# Patient Record
Sex: MALE | Race: WHITE | NOT HISPANIC OR LATINO | Employment: UNEMPLOYED | URBAN - METROPOLITAN AREA
[De-identification: names, ages, dates, MRNs, and addresses within clinical notes are randomized per-mention and may not be internally consistent; named-entity substitution may affect disease eponyms.]

---

## 2021-12-10 ENCOUNTER — HOSPITAL ENCOUNTER (INPATIENT)
Facility: CLINIC | Age: 36
LOS: 4 days | Discharge: HOME | End: 2021-12-14
Attending: EMERGENCY MEDICINE | Admitting: INTERNAL MEDICINE
Payer: COMMERCIAL

## 2021-12-10 DIAGNOSIS — F10.931 ALCOHOL WITHDRAWAL, WITH DELIRIUM (H): ICD-10-CM

## 2021-12-10 DIAGNOSIS — J18.9 PNEUMONIA DUE TO INFECTIOUS ORGANISM, UNSPECIFIED LATERALITY, UNSPECIFIED PART OF LUNG: Primary | ICD-10-CM

## 2021-12-10 DIAGNOSIS — R44.3 HALLUCINATIONS: ICD-10-CM

## 2021-12-10 LAB
ALBUMIN SERPL-MCNC: 3.7 G/DL (ref 3.4–5)
ALP SERPL-CCNC: 59 U/L (ref 40–150)
ALT SERPL W P-5'-P-CCNC: 60 U/L (ref 0–70)
ANION GAP SERPL CALCULATED.3IONS-SCNC: 8 MMOL/L (ref 3–14)
AST SERPL W P-5'-P-CCNC: 42 U/L (ref 0–45)
BASOPHILS # BLD AUTO: 0 10E3/UL (ref 0–0.2)
BASOPHILS NFR BLD AUTO: 0 %
BILIRUB SERPL-MCNC: 1.8 MG/DL (ref 0.2–1.3)
BUN SERPL-MCNC: 6 MG/DL (ref 7–30)
CALCIUM SERPL-MCNC: 9.1 MG/DL (ref 8.5–10.1)
CHLORIDE BLD-SCNC: 102 MMOL/L (ref 94–109)
CO2 SERPL-SCNC: 24 MMOL/L (ref 20–32)
CREAT SERPL-MCNC: 0.76 MG/DL (ref 0.66–1.25)
CREAT SERPL-MCNC: 0.76 MG/DL (ref 0.66–1.25)
EOSINOPHIL # BLD AUTO: 0.1 10E3/UL (ref 0–0.7)
EOSINOPHIL NFR BLD AUTO: 2 %
ERYTHROCYTE [DISTWIDTH] IN BLOOD BY AUTOMATED COUNT: 11.5 % (ref 10–15)
GFR SERPL CREATININE-BSD FRML MDRD: >90 ML/MIN/1.73M2
GFR SERPL CREATININE-BSD FRML MDRD: >90 ML/MIN/1.73M2
GLUCOSE BLD-MCNC: 135 MG/DL (ref 70–99)
GLUCOSE BLDC GLUCOMTR-MCNC: 101 MG/DL (ref 70–99)
GLUCOSE BLDC GLUCOMTR-MCNC: 133 MG/DL (ref 70–99)
HCT VFR BLD AUTO: 40.9 % (ref 40–53)
HGB BLD-MCNC: 14 G/DL (ref 13.3–17.7)
HOLD SPECIMEN: NORMAL
HOLD SPECIMEN: NORMAL
IMM GRANULOCYTES # BLD: 0 10E3/UL
IMM GRANULOCYTES NFR BLD: 0 %
LYMPHOCYTES # BLD AUTO: 0.5 10E3/UL (ref 0.8–5.3)
LYMPHOCYTES NFR BLD AUTO: 13 %
MAGNESIUM SERPL-MCNC: 2.2 MG/DL (ref 1.6–2.3)
MAGNESIUM SERPL-MCNC: 2.2 MG/DL (ref 1.6–2.3)
MCH RBC QN AUTO: 33 PG (ref 26.5–33)
MCHC RBC AUTO-ENTMCNC: 34.2 G/DL (ref 31.5–36.5)
MCV RBC AUTO: 97 FL (ref 78–100)
MONOCYTES # BLD AUTO: 0.7 10E3/UL (ref 0–1.3)
MONOCYTES NFR BLD AUTO: 16 %
NEUTROPHILS # BLD AUTO: 2.9 10E3/UL (ref 1.6–8.3)
NEUTROPHILS NFR BLD AUTO: 69 %
NRBC # BLD AUTO: 0 10E3/UL
NRBC BLD AUTO-RTO: 0 /100
PHOSPHATE SERPL-MCNC: 3.5 MG/DL (ref 2.5–4.5)
PHOSPHATE SERPL-MCNC: 4.9 MG/DL (ref 2.5–4.5)
PLATELET # BLD AUTO: 84 10E3/UL (ref 150–450)
POTASSIUM BLD-SCNC: 3.2 MMOL/L (ref 3.4–5.3)
POTASSIUM BLD-SCNC: 3.2 MMOL/L (ref 3.4–5.3)
PROT SERPL-MCNC: 7.1 G/DL (ref 6.8–8.8)
RBC # BLD AUTO: 4.24 10E6/UL (ref 4.4–5.9)
SARS-COV-2 RNA RESP QL NAA+PROBE: NEGATIVE
SODIUM SERPL-SCNC: 134 MMOL/L (ref 133–144)
WBC # BLD AUTO: 4.2 10E3/UL (ref 4–11)

## 2021-12-10 PROCEDURE — 80053 COMPREHEN METABOLIC PANEL: CPT | Performed by: EMERGENCY MEDICINE

## 2021-12-10 PROCEDURE — 36415 COLL VENOUS BLD VENIPUNCTURE: CPT | Performed by: INTERNAL MEDICINE

## 2021-12-10 PROCEDURE — 250N000009 HC RX 250: Performed by: INTERNAL MEDICINE

## 2021-12-10 PROCEDURE — 250N000011 HC RX IP 250 OP 636: Performed by: INTERNAL MEDICINE

## 2021-12-10 PROCEDURE — 250N000013 HC RX MED GY IP 250 OP 250 PS 637: Performed by: EMERGENCY MEDICINE

## 2021-12-10 PROCEDURE — 83735 ASSAY OF MAGNESIUM: CPT | Performed by: INTERNAL MEDICINE

## 2021-12-10 PROCEDURE — 36415 COLL VENOUS BLD VENIPUNCTURE: CPT | Performed by: EMERGENCY MEDICINE

## 2021-12-10 PROCEDURE — 99223 1ST HOSP IP/OBS HIGH 75: CPT | Mod: AI | Performed by: INTERNAL MEDICINE

## 2021-12-10 PROCEDURE — 85025 COMPLETE CBC W/AUTO DIFF WBC: CPT | Performed by: EMERGENCY MEDICINE

## 2021-12-10 PROCEDURE — 99291 CRITICAL CARE FIRST HOUR: CPT | Mod: 25

## 2021-12-10 PROCEDURE — 87635 SARS-COV-2 COVID-19 AMP PRB: CPT | Performed by: INTERNAL MEDICINE

## 2021-12-10 PROCEDURE — 96375 TX/PRO/DX INJ NEW DRUG ADDON: CPT

## 2021-12-10 PROCEDURE — 258N000003 HC RX IP 258 OP 636: Performed by: EMERGENCY MEDICINE

## 2021-12-10 PROCEDURE — 96365 THER/PROPH/DIAG IV INF INIT: CPT

## 2021-12-10 PROCEDURE — 96361 HYDRATE IV INFUSION ADD-ON: CPT

## 2021-12-10 PROCEDURE — 258N000003 HC RX IP 258 OP 636: Performed by: INTERNAL MEDICINE

## 2021-12-10 PROCEDURE — 250N000011 HC RX IP 250 OP 636

## 2021-12-10 PROCEDURE — 120N000001 HC R&B MED SURG/OB

## 2021-12-10 PROCEDURE — 96376 TX/PRO/DX INJ SAME DRUG ADON: CPT

## 2021-12-10 PROCEDURE — 84100 ASSAY OF PHOSPHORUS: CPT | Performed by: INTERNAL MEDICINE

## 2021-12-10 PROCEDURE — HZ2ZZZZ DETOXIFICATION SERVICES FOR SUBSTANCE ABUSE TREATMENT: ICD-10-PCS | Performed by: INTERNAL MEDICINE

## 2021-12-10 PROCEDURE — 250N000011 HC RX IP 250 OP 636: Performed by: EMERGENCY MEDICINE

## 2021-12-10 PROCEDURE — C9803 HOPD COVID-19 SPEC COLLECT: HCPCS

## 2021-12-10 PROCEDURE — 99292 CRITICAL CARE ADDL 30 MIN: CPT

## 2021-12-10 PROCEDURE — 96368 THER/DIAG CONCURRENT INF: CPT

## 2021-12-10 PROCEDURE — 84132 ASSAY OF SERUM POTASSIUM: CPT | Performed by: INTERNAL MEDICINE

## 2021-12-10 PROCEDURE — 96366 THER/PROPH/DIAG IV INF ADDON: CPT

## 2021-12-10 RX ORDER — HALOPERIDOL 5 MG/ML
5 INJECTION INTRAMUSCULAR ONCE
Status: COMPLETED | OUTPATIENT
Start: 2021-12-10 | End: 2021-12-10

## 2021-12-10 RX ORDER — LORAZEPAM 2 MG/ML
INJECTION INTRAMUSCULAR
Status: COMPLETED
Start: 2021-12-10 | End: 2021-12-10

## 2021-12-10 RX ORDER — FOLIC ACID 5 MG/ML
1 INJECTION, SOLUTION INTRAMUSCULAR; INTRAVENOUS; SUBCUTANEOUS ONCE
Status: COMPLETED | OUTPATIENT
Start: 2021-12-10 | End: 2021-12-10

## 2021-12-10 RX ORDER — LORAZEPAM 0.5 MG/1
1-2 TABLET ORAL EVERY 30 MIN PRN
Status: DISCONTINUED | OUTPATIENT
Start: 2021-12-10 | End: 2021-12-14 | Stop reason: HOSPADM

## 2021-12-10 RX ORDER — METOPROLOL TARTRATE 1 MG/ML
5 INJECTION, SOLUTION INTRAVENOUS EVERY 6 HOURS PRN
Status: DISCONTINUED | OUTPATIENT
Start: 2021-12-10 | End: 2021-12-11

## 2021-12-10 RX ORDER — FLUMAZENIL 0.1 MG/ML
0.2 INJECTION, SOLUTION INTRAVENOUS
Status: DISCONTINUED | OUTPATIENT
Start: 2021-12-10 | End: 2021-12-11

## 2021-12-10 RX ORDER — ONDANSETRON 2 MG/ML
4 INJECTION INTRAMUSCULAR; INTRAVENOUS EVERY 6 HOURS PRN
Status: DISCONTINUED | OUTPATIENT
Start: 2021-12-10 | End: 2021-12-14 | Stop reason: HOSPADM

## 2021-12-10 RX ORDER — HALOPERIDOL 5 MG/ML
INJECTION INTRAMUSCULAR
Status: COMPLETED
Start: 2021-12-10 | End: 2021-12-10

## 2021-12-10 RX ORDER — DEXTROSE MONOHYDRATE 25 G/50ML
25-50 INJECTION, SOLUTION INTRAVENOUS
Status: DISCONTINUED | OUTPATIENT
Start: 2021-12-10 | End: 2021-12-14 | Stop reason: HOSPADM

## 2021-12-10 RX ORDER — MULTIPLE VITAMINS W/ MINERALS TAB 9MG-400MCG
1 TAB ORAL DAILY
Status: DISCONTINUED | OUTPATIENT
Start: 2021-12-10 | End: 2021-12-11

## 2021-12-10 RX ORDER — NICOTINE POLACRILEX 4 MG
15-30 LOZENGE BUCCAL
Status: DISCONTINUED | OUTPATIENT
Start: 2021-12-10 | End: 2021-12-14 | Stop reason: HOSPADM

## 2021-12-10 RX ORDER — PROCHLORPERAZINE 25 MG
25 SUPPOSITORY, RECTAL RECTAL EVERY 12 HOURS PRN
Status: DISCONTINUED | OUTPATIENT
Start: 2021-12-10 | End: 2021-12-14 | Stop reason: HOSPADM

## 2021-12-10 RX ORDER — LORAZEPAM 2 MG/ML
16 INJECTION INTRAMUSCULAR ONCE
Status: DISCONTINUED | OUTPATIENT
Start: 2021-12-10 | End: 2021-12-10

## 2021-12-10 RX ORDER — SODIUM CHLORIDE, SODIUM LACTATE, POTASSIUM CHLORIDE, CALCIUM CHLORIDE 600; 310; 30; 20 MG/100ML; MG/100ML; MG/100ML; MG/100ML
INJECTION, SOLUTION INTRAVENOUS CONTINUOUS
Status: DISCONTINUED | OUTPATIENT
Start: 2021-12-10 | End: 2021-12-10

## 2021-12-10 RX ORDER — ONDANSETRON 2 MG/ML
4 INJECTION INTRAMUSCULAR; INTRAVENOUS EVERY 30 MIN PRN
Status: DISCONTINUED | OUTPATIENT
Start: 2021-12-10 | End: 2021-12-11

## 2021-12-10 RX ORDER — HALOPERIDOL 5 MG/ML
2.5-5 INJECTION INTRAMUSCULAR EVERY 4 HOURS PRN
Status: DISCONTINUED | OUTPATIENT
Start: 2021-12-10 | End: 2021-12-14 | Stop reason: HOSPADM

## 2021-12-10 RX ORDER — LORAZEPAM 2 MG/ML
4 INJECTION INTRAMUSCULAR ONCE
Status: COMPLETED | OUTPATIENT
Start: 2021-12-10 | End: 2021-12-10

## 2021-12-10 RX ORDER — MULTIVITAMIN,THERAPEUTIC
1 TABLET ORAL ONCE
Status: COMPLETED | OUTPATIENT
Start: 2021-12-10 | End: 2021-12-10

## 2021-12-10 RX ORDER — LORAZEPAM 2 MG/ML
8 INJECTION INTRAMUSCULAR ONCE
Status: COMPLETED | OUTPATIENT
Start: 2021-12-10 | End: 2021-12-10

## 2021-12-10 RX ORDER — MAGNESIUM OXIDE 400 MG/1
800 TABLET ORAL ONCE
Status: DISCONTINUED | OUTPATIENT
Start: 2021-12-10 | End: 2021-12-10

## 2021-12-10 RX ORDER — DEXTROSE MONOHYDRATE, SODIUM CHLORIDE, AND POTASSIUM CHLORIDE 50; 1.49; 4.5 G/1000ML; G/1000ML; G/1000ML
INJECTION, SOLUTION INTRAVENOUS CONTINUOUS
Status: DISCONTINUED | OUTPATIENT
Start: 2021-12-10 | End: 2021-12-13

## 2021-12-10 RX ORDER — SODIUM CHLORIDE 9 MG/ML
INJECTION, SOLUTION INTRAVENOUS CONTINUOUS
Status: DISCONTINUED | OUTPATIENT
Start: 2021-12-10 | End: 2021-12-13

## 2021-12-10 RX ORDER — LORAZEPAM 2 MG/ML
2 INJECTION INTRAMUSCULAR ONCE
Status: COMPLETED | OUTPATIENT
Start: 2021-12-10 | End: 2021-12-10

## 2021-12-10 RX ORDER — LORAZEPAM 2 MG/ML
6 INJECTION INTRAMUSCULAR ONCE
Status: COMPLETED | OUTPATIENT
Start: 2021-12-10 | End: 2021-12-10

## 2021-12-10 RX ORDER — ONDANSETRON 4 MG/1
4 TABLET, ORALLY DISINTEGRATING ORAL EVERY 6 HOURS PRN
Status: DISCONTINUED | OUTPATIENT
Start: 2021-12-10 | End: 2021-12-14 | Stop reason: HOSPADM

## 2021-12-10 RX ORDER — DEXMEDETOMIDINE HYDROCHLORIDE 4 UG/ML
.2-1 INJECTION, SOLUTION INTRAVENOUS CONTINUOUS
Status: DISPENSED | OUTPATIENT
Start: 2021-12-10 | End: 2021-12-12

## 2021-12-10 RX ORDER — PROCHLORPERAZINE MALEATE 5 MG
10 TABLET ORAL EVERY 6 HOURS PRN
Status: DISCONTINUED | OUTPATIENT
Start: 2021-12-10 | End: 2021-12-14 | Stop reason: HOSPADM

## 2021-12-10 RX ORDER — FOLIC ACID 1 MG/1
1 TABLET ORAL DAILY
Status: DISCONTINUED | OUTPATIENT
Start: 2021-12-13 | End: 2021-12-10

## 2021-12-10 RX ORDER — GABAPENTIN 300 MG/1
1200 CAPSULE ORAL ONCE
Status: DISCONTINUED | OUTPATIENT
Start: 2021-12-10 | End: 2021-12-10

## 2021-12-10 RX ORDER — FOLIC ACID 1 MG/1
1 TABLET ORAL ONCE
Status: COMPLETED | OUTPATIENT
Start: 2021-12-10 | End: 2021-12-10

## 2021-12-10 RX ORDER — LORAZEPAM 2 MG/ML
1-2 INJECTION INTRAMUSCULAR EVERY 30 MIN PRN
Status: DISCONTINUED | OUTPATIENT
Start: 2021-12-10 | End: 2021-12-14 | Stop reason: HOSPADM

## 2021-12-10 RX ORDER — FOLIC ACID 5 MG/ML
1 INJECTION, SOLUTION INTRAMUSCULAR; INTRAVENOUS; SUBCUTANEOUS DAILY
Status: DISPENSED | OUTPATIENT
Start: 2021-12-11 | End: 2021-12-13

## 2021-12-10 RX ADMIN — LORAZEPAM 2 MG: 2 INJECTION INTRAMUSCULAR; INTRAVENOUS at 10:53

## 2021-12-10 RX ADMIN — HALOPERIDOL LACTATE 5 MG: 5 INJECTION, SOLUTION INTRAMUSCULAR at 11:07

## 2021-12-10 RX ADMIN — LORAZEPAM 6 MG: 2 INJECTION INTRAMUSCULAR at 10:37

## 2021-12-10 RX ADMIN — THIAMINE HYDROCHLORIDE 200 MG: 100 INJECTION, SOLUTION INTRAMUSCULAR; INTRAVENOUS at 12:59

## 2021-12-10 RX ADMIN — LORAZEPAM 2 MG: 2 INJECTION INTRAMUSCULAR; INTRAVENOUS at 14:08

## 2021-12-10 RX ADMIN — LORAZEPAM 2 MG: 2 INJECTION INTRAMUSCULAR; INTRAVENOUS at 16:05

## 2021-12-10 RX ADMIN — THIAMINE HCL TAB 100 MG 100 MG: 100 TAB at 10:21

## 2021-12-10 RX ADMIN — HALOPERIDOL LACTATE 5 MG: 5 INJECTION, SOLUTION INTRAMUSCULAR at 11:11

## 2021-12-10 RX ADMIN — ENOXAPARIN SODIUM 40 MG: 40 INJECTION SUBCUTANEOUS at 14:39

## 2021-12-10 RX ADMIN — LORAZEPAM 2 MG: 2 INJECTION INTRAMUSCULAR; INTRAVENOUS at 11:10

## 2021-12-10 RX ADMIN — LORAZEPAM 8 MG: 2 INJECTION INTRAMUSCULAR; INTRAVENOUS at 13:19

## 2021-12-10 RX ADMIN — DEXMEDETOMIDINE 0.2 MCG/KG/HR: 100 INJECTION, SOLUTION, CONCENTRATE INTRAVENOUS at 13:18

## 2021-12-10 RX ADMIN — THERA TABS 1 TABLET: TAB at 10:21

## 2021-12-10 RX ADMIN — SODIUM CHLORIDE, POTASSIUM CHLORIDE, SODIUM LACTATE AND CALCIUM CHLORIDE 1000 ML: 600; 310; 30; 20 INJECTION, SOLUTION INTRAVENOUS at 14:39

## 2021-12-10 RX ADMIN — VALPROATE SODIUM 250 MG: 100 INJECTION, SOLUTION INTRAVENOUS at 13:34

## 2021-12-10 RX ADMIN — LORAZEPAM 2 MG: 2 INJECTION INTRAMUSCULAR; INTRAVENOUS at 11:06

## 2021-12-10 RX ADMIN — THIAMINE HYDROCHLORIDE 200 MG: 100 INJECTION, SOLUTION INTRAMUSCULAR; INTRAVENOUS at 20:27

## 2021-12-10 RX ADMIN — LORAZEPAM 2 MG: 2 INJECTION INTRAMUSCULAR; INTRAVENOUS at 10:20

## 2021-12-10 RX ADMIN — VALPROATE SODIUM 500 MG: 100 INJECTION, SOLUTION INTRAVENOUS at 23:25

## 2021-12-10 RX ADMIN — SODIUM CHLORIDE, POTASSIUM CHLORIDE, SODIUM LACTATE AND CALCIUM CHLORIDE 1000 ML: 600; 310; 30; 20 INJECTION, SOLUTION INTRAVENOUS at 13:03

## 2021-12-10 RX ADMIN — LORAZEPAM 6 MG: 2 INJECTION INTRAMUSCULAR; INTRAVENOUS at 10:37

## 2021-12-10 RX ADMIN — LORAZEPAM 2 MG: 2 INJECTION INTRAMUSCULAR; INTRAVENOUS at 11:05

## 2021-12-10 RX ADMIN — POTASSIUM CHLORIDE, DEXTROSE MONOHYDRATE AND SODIUM CHLORIDE: 150; 5; 450 INJECTION, SOLUTION INTRAVENOUS at 12:19

## 2021-12-10 RX ADMIN — LORAZEPAM 4 MG: 2 INJECTION INTRAMUSCULAR; INTRAVENOUS at 14:17

## 2021-12-10 RX ADMIN — LORAZEPAM 2 MG: 2 INJECTION INTRAMUSCULAR; INTRAVENOUS at 09:55

## 2021-12-10 RX ADMIN — FOLIC ACID 1 MG: 1 TABLET ORAL at 10:21

## 2021-12-10 RX ADMIN — FOLIC ACID 1 MG: 5 INJECTION, SOLUTION INTRAMUSCULAR; INTRAVENOUS; SUBCUTANEOUS at 12:54

## 2021-12-10 RX ADMIN — SODIUM CHLORIDE 1000 ML: 9 INJECTION, SOLUTION INTRAVENOUS at 09:53

## 2021-12-10 RX ADMIN — HALOPERIDOL LACTATE 5 MG: 5 INJECTION, SOLUTION INTRAMUSCULAR at 14:35

## 2021-12-10 RX ADMIN — LORAZEPAM 2 MG: 2 INJECTION INTRAMUSCULAR; INTRAVENOUS at 18:19

## 2021-12-10 ASSESSMENT — ACTIVITIES OF DAILY LIVING (ADL)
ADLS_ACUITY_SCORE: 12

## 2021-12-10 ASSESSMENT — ENCOUNTER SYMPTOMS
TREMORS: 1
HALLUCINATIONS: 1
PALPITATIONS: 1
SLEEP DISTURBANCE: 1

## 2021-12-10 ASSESSMENT — MIFFLIN-ST. JEOR: SCORE: 1677.42

## 2021-12-10 NOTE — ED PROVIDER NOTES
"  History   Chief Complaint:  Delirium Tremens (DTS)    The history is provided by the patient.      Satya Riley is a 36 year old male who presents with DTS. Rosemary has been drinking at least 12 bears daily for this entire year and stopped on Sunday. Since Sunday he has been having really bad tremors with auditory and visual hallucinations. He sees dead family members and will speak to them. He is also hearing music playing when there is none.  He also has not been sleeping much in the past few days. Yesterday his heart rate was at 125. He has never had alcohol withdrawal before.      Review of Systems   Cardiovascular: Positive for palpitations.   Neurological: Positive for tremors.   Psychiatric/Behavioral: Positive for hallucinations and sleep disturbance.   All other systems reviewed and are negative.        Allergies:  Amoxicillin    Medications:  The patient is currently on no regular medications.    Past Medical History:     The patient denies any significant past medical history.      Past Surgical History:    REPAIR, INTRINSIC MUSCLES OF HAND, EACH MUSCLE    Social History:  Presents with sister  PCP: [facility]      Physical Exam     Patient Vitals for the past 24 hrs:   BP Temp Temp src Pulse Resp SpO2 Height Weight   12/10/21 1200 94/49 -- -- 80 12 98 % -- --   12/10/21 1145 117/64 -- -- 88 12 100 % -- --   12/10/21 1130 120/65 -- -- 89 12 100 % -- --   12/10/21 1115 120/70 -- -- 86 13 100 % -- --   12/10/21 1100 109/72 -- -- 81 13 91 % -- --   12/10/21 1030 105/69 -- -- 77 -- 99 % -- --   12/10/21 1015 115/72 -- -- 88 -- 100 % -- --   12/10/21 1000 107/72 -- -- 87 -- 99 % -- --   12/10/21 0945 122/80 -- -- 100 -- 98 % -- --   12/10/21 0758 (!) 137/95 98.9  F (37.2  C) Temporal 98 18 100 % 1.956 m (6' 5\") 63.5 kg (140 lb)       Physical Exam  Vitals: reviewed by me  General: Pt seen on [facility], mildly tremulous and diaphoretic, but can tell me the date, where he is, and " answers to person.  Eyes: Tracking well, clear conjunctiva BL  ENT: MMM, midline trachea.   Lungs: No tachypnea, no accessory muscle use. No respiratory distress.   CV: Rate as above  Abd: Soft, non tender, no guarding, no rebound. Non distended  MSK: no joint effusion.  No evidence of trauma  Skin: No rash  Neuro: Clear speech, somewhat anxious appearing, tremors noted in bilateral upper extremities.  Psych: Not RIS, no e/o AH/VH      Emergency Department Course     Laboratory:  Labs Ordered and Resulted from Time of ED Arrival to Time of ED Departure   COMPREHENSIVE METABOLIC PANEL - Abnormal       Result Value    Sodium 134      Potassium 3.2 (*)     Chloride 102      Carbon Dioxide (CO2) 24      Anion Gap 8      Urea Nitrogen 6 (*)     Creatinine 0.76      Calcium 9.1      Glucose 135 (*)     Alkaline Phosphatase 59      AST 42      ALT 60      Protein Total 7.1      Albumin 3.7      Bilirubin Total 1.8 (*)     GFR Estimate >90     CBC WITH PLATELETS AND DIFFERENTIAL - Abnormal    WBC Count 4.2      RBC Count 4.24 (*)     Hemoglobin 14.0      Hematocrit 40.9      MCV 97      MCH 33.0      MCHC 34.2      RDW 11.5      Platelet Count 84 (*)     % Neutrophils 69      % Lymphocytes 13      % Monocytes 16      % Eosinophils 2      % Basophils 0      % Immature Granulocytes 0      NRBCs per 100 WBC 0      Absolute Neutrophils 2.9      Absolute Lymphocytes 0.5 (*)     Absolute Monocytes 0.7      Absolute Eosinophils 0.1      Absolute Basophils 0.0      Absolute Immature Granulocytes 0.0      Absolute NRBCs 0.0     GLUCOSE BY METER - Abnormal    GLUCOSE BY METER POCT 101 (*)    PHOSPHORUS - Normal    Phosphorus 3.5     MAGNESIUM - Normal    Magnesium 2.2     CREATININE - Normal    Creatinine 0.76      GFR Estimate >90     GLUCOSE MONITOR NURSING POCT   COVID-19 VIRUS (CORONAVIRUS) BY PCR   MAGNESIUM   PHOSPHORUS   POTASSIUM        Procedures        Emergency Department Course:             Reviewed:  I reviewed nursing  notes, vitals, past medical history and Care Everywhere    Assessments:  0923 I obtained history and examined the patient as noted above.   0955    patient exam is worsening, now frankly altered and confused  1042 patient's exam continues to be concerning for delirium tremens, Ativan beginning to have an effect.      Interventions:  32mg Ativan  10mg Haldol  Precedex gtt      Disposition:  The patient was discharged to home.     Impression & Plan     Medical Decision Making:  This is a pleasant 36-year-old male who presents emergency room with appears to be alcohol withdrawal.  Curiously, despite going cold turkey after drinking every day for a year, when he initially arrived, he was alert and oriented to person place time and date.  He did have slight tremors, this was treated with Ativan, and within a short amount of time here in the ER, he did develop full on delirium tremens, and complete loss of orientation.  He required subsequent dosing of 28 mg of Ativan, and 10 mg of Haldol, before he was calm and resting comfortably.  He also has now been initiated on a Precedex drip, and will be monitored very carefully until he is admitted to the ICU.  He is protecting his airway, he does have supplemental oxygen on out of an abundance of caution.  His sister is at the bedside as well, and understands the plan of care.  Admitted the patient to the care of Dr Diaz, who is kindly agreed accept care of the patient.  We will plan for careful monitoring until I see beds available, we do anticipate long boarding times to go to the ICU.    1:30 PM  Interval assessment now that patient has been restabilize.  While he was doing quite well initially, over the course of about an hour became clear that he was entering severe delirium tremens.  He received 24 mg of Ativan, as well as 10 mg of Haldol, and a Precedex drip was started with good results.  He then required an additional 8 mg of Ativan just now, and continues to do  well.    Critical Care Time: was 70 minutes for this patient excluding procedures    Diagnosis:    ICD-10-CM    1. Alcohol withdrawal, with delirium (H)  F10.231    2. Hallucinations  R44.3        Scribe Disclosure:  I, Deanne Tran, am serving as a scribe at 9:23 AM on 12/10/2021 to document services personally performed by Elliot Sims* based on my observations and the provider's statements to me.              Elliot Sims MD  12/10/21 [id number]

## 2021-12-10 NOTE — ED NOTES
Pt started pulling at blood pressure cuff and side rails after initial two doses of ativan 4mg total. Pt able to be redirected. Pt family put on call light and when writer entered room pt was attempting to get out of bed and experiencing full body tremors and was extremely agitated. Per MD orders multiple doses of ativan and haldol given. Two RNs and MD present to help keep patient safe in bed during administration. Oxymask applied when patient non tremulous and snoring.

## 2021-12-10 NOTE — ED TRIAGE NOTES
ABCs intact. Pt hx ETOH. Pt drinks at least 8 beers daily. Pt stopped drinking about 4 days ago. Pt c/o shaking, audio and visual hallucinations. Pt hasn't slept for the past two days.

## 2021-12-10 NOTE — PHARMACY-ADMISSION MEDICATION HISTORY
Admission medication history interview status for this patient is complete. See [facility] admission navigator for allergy information, prior to admission medications and immunization status.     Medication history interview done, indicate source(s): Family  Medication history resources (including written lists, pill bottles, clinic record): Epic, no fill history.   [facility]: [facility]    Changes made to PTA medication list:  Added: none  Changed: none  Reported as Not Taking: none  Removed: none    Actions taken by pharmacist (provider contacted, etc):None     Additional medication history information:None    Medication reconciliation/reorder completed by provider prior to medication history?  N   (Y/N)     Prior to Admission medications    No current medications

## 2021-12-10 NOTE — H&P
[facility] History and Physical    Satya Riley MRN# [id number]   Age: 36 year old YOB: 1985     Date of Admission:  12/10/2021    Home clinic: Not established          Assessment and Plan:   Assessment:   Satya Riley is a 36-year-old man who was brought to the emergency department due to ongoing hallucinations.    Mr. Riley reportedly has no significant medical history.      On presentation to the emergency department, Mr. Gaitan had reported that he had stopped drinking 4 days prior.  He was able to give some information initially at which time he described hallucinations.  Initial vital signs: HR 98, /95, RR 18, oxygen saturation 100% breathing room air.  Afebrile.  Initially, according to Dr. Sims, the patient was able to give some history.  He was noted to be mildly tremulous and diaphoretic and appeared at least mildly anxious.  Labs: Creatinine 0.76, potassium 3.2, bilirubin 1.8.  WBC 4.2, Hgb 14 with MCV 97 and PLT 84.    Shortly following the initial assessment, Mr. Riley rapidly escalated and became markedly agitated and disoriented.  He was evidently hallucinating and not following directions.  Ultimately, the emergency room team gave him more than 25 mg of lorazepam as well as Haldol 10 mg over the course of couple of hours.  He became very somnolent at that point I was asked to admit him to the ICU.  We have no ICU beds and patient is currently being monitored in the emergency department.    Dx:  1.  Alcohol abuse and dependence with severe DTs.   2.  Thrombocytopenia, most likely due to alcohol toxicity to the bone marrow.  3.  Electrolyte disturbances presumably due to inadequate intake.      Plan:   1.  Admit to ICU status.  2.  N.p.o. except for ice chips and medications.  3.  CIWA protocol using Depakote IV, Lorazepam as needed and Precedex. Will try to avoid use of Haldol.  4.  Pantoprazole.   5.  IVF with dextrose after Thiamine is  "given.              Chief Complaint:   Hallucinations associated with alcohol withdrawal     I am unable to communicate with the patient who is now significantly sedated.  His girlfriend is at the bedside and gives all the available information.  She indicates patient has no significant past medical history, is not on chronic medications.  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    Mr. Riley apparently has not previously had major problems with alcohol abuse but in the last year has been drinking heavily \"due to stress\".  He is evidently having trouble with custody issues.    Mr. Riley started drinking heavily about a year ago and increased it when he was laid off in September.  He decided to stop drinking \"cold turkey\" this past Sunday.  Since then he went on an [place] trip [place] with his [place] and apparently started to develop hallucinations, both visual and auditory.  It is not clear why the patient's father did not bring him to attention. He has not been sleeping normally and has not been eating apparently since about October when he started drinking heavily.    He reportedly has not had fevers, sweats or chills.  No evident trouble with shortness of breath or cough.  No nausea or vomiting though he has not been eating anything at all.  No report of diarrhea or constipation, urinary change.  He apparently has \"been bad circulation in his toes\" but otherwise has been very healthy.        Past Medical History:   Reportedly no chronic medical illnesses and no hospitalizations.         Past Surgical History:   Finger trauma evidently resulted in amputation.         Social History:   .  Currently lives with his girlfriend.  Currently unemployed.  Chews tobacco but does not smoke.  Alcohol intake is primarily beer.         Family History:   Unable         " Immunizations:   Immunizations up-to-date including 2 Covid vaccines (Young) completed in May.         Allergies:     Allergies   Allergen Reactions     Penicillins              Medications:   None         Review of Systems:   Unable          Physical Exam:     Vitals were reviewed  Patient Vitals for the past 8 hrs:   BP Pulse Resp SpO2   12/10/21 1545 94/67 70 19 100 %   12/10/21 1530 105/74 72 21 98 %   12/10/21 1519 -- 71 16 91 %   12/10/21 1515 101/74 73 14 91 %   12/10/21 1500 112/75 68 14 98 %   12/10/21 1445 98/73 78 19 --   12/10/21 1440 (!) 80/64 80 22 99 %   12/10/21 1420 100/65 80 23 98 %   12/10/21 1400 107/76 74 14 91 %   12/10/21 1350 112/84 85 18 96 %   12/10/21 1345 112/84 80 14 97 %   12/10/21 1340 -- 71 12 97 %   12/10/21 1330 115/77 72 13 100 %   12/10/21 1320 118/78 73 14 96 %   12/10/21 1315 118/78 86 28 99 %   12/10/21 1300 92/51 75 15 95 %   12/10/21 1200 94/49 80 12 98 %   12/10/21 1145 117/64 88 12 100 %   12/10/21 1130 120/65 89 12 100 %   12/10/21 1115 120/70 86 13 100 %   12/10/21 1100 109/72 81 13 91 %   12/10/21 1030 105/69 77 -- 99 %   12/10/21 1015 115/72 88 -- 100 %   12/10/21 1000 107/72 87 -- 99 %   12/10/21 0945 122/80 100 -- 98 %     Constitutional: Profoundly somnolent on benzodiazepines and currently on Precedex drip  ENT: Normocephalic, without obvious abnormality, atraumatic.  Neck: supple, symmetrical, trachea midline  Hematologic / Lymphatic: No cervical lymphadenopathy and no supraclavicular lymphadenopathy.  Lungs: No increased work of breathing, good air exchange, clear to auscultation bilaterally, no crackles or wheezing.  Cardiovascular: Regular rate and rhythm, normal S1 and S2, no S3 or S4, and no murmur noted.  Abdomen: No scars, normal bowel sounds, soft, non-distended, no masses palpated, no hepatosplenomegaly.  Not obviously tender but the patient's significantly sedated.  Genitourinary: Deferred  Musculoskeletal: no lower extremity pitting edema present. There  is no redness, warmth, or swelling of the joints  Neurologic: Unable  Skin: No rashes, erythema, pallor, petechia or purpura.          Data:     Results for orders placed or performed during the hospital encounter of 12/10/21 (from the past 24 hour(s))   CBC with platelets differential    Narrative    The following orders were created for panel order CBC with platelets differential.  Procedure                               Abnormality         Status                     ---------                               -----------         ------                     CBC with platelets and d...[[id number]]  Abnormal            Final result                 Please view results for these tests on the individual orders.   Comprehensive metabolic panel   Result Value Ref Range    Sodium 134 133 - 144 mmol/L    Potassium 3.2 (L) 3.4 - 5.3 mmol/L    Chloride 102 94 - 109 mmol/L    Carbon Dioxide (CO2) 24 20 - 32 mmol/L    Anion Gap 8 3 - 14 mmol/L    Urea Nitrogen 6 (L) 7 - 30 mg/dL    Creatinine 0.76 0.66 - 1.25 mg/dL    Calcium 9.1 8.5 - 10.1 mg/dL    Glucose 135 (H) 70 - 99 mg/dL    Alkaline Phosphatase 59 40 - 150 U/L    AST 42 0 - 45 U/L    ALT 60 0 - 70 U/L    Protein Total 7.1 6.8 - 8.8 g/dL    Albumin 3.7 3.4 - 5.0 g/dL    Bilirubin Total 1.8 (H) 0.2 - 1.3 mg/dL    GFR Estimate >90 >60 mL/min/1.73m2   [place] Draw    Narrative    The following orders were created for panel order [place] Draw.  Procedure                               Abnormality         Status                     ---------                               -----------         ------                     Extra Blue Top Tube[[id number]]                              Final result               Extra Red Top Tube[[id number]]                               Final result                 Please view results for these tests on the individual orders.   CBC with platelets and differential   Result Value Ref Range    WBC Count 4.2 4.0 - 11.0 10e3/uL    RBC Count 4.24 (L) 4.40 - 5.90  10e6/uL    Hemoglobin 14.0 13.3 - 17.7 g/dL    Hematocrit 40.9 40.0 - 53.0 %    MCV 97 78 - 100 fL    MCH 33.0 26.5 - 33.0 pg    MCHC 34.2 31.5 - 36.5 g/dL    RDW 11.5 10.0 - 15.0 %    Platelet Count 84 (L) 150 - 450 10e3/uL    % Neutrophils 69 %    % Lymphocytes 13 %    % Monocytes 16 %    % Eosinophils 2 %    % Basophils 0 %    % Immature Granulocytes 0 %    NRBCs per 100 WBC 0 <1 /100    Absolute Neutrophils 2.9 1.6 - 8.3 10e3/uL    Absolute Lymphocytes 0.5 (L) 0.8 - 5.3 10e3/uL    Absolute Monocytes 0.7 0.0 - 1.3 10e3/uL    Absolute Eosinophils 0.1 0.0 - 0.7 10e3/uL    Absolute Basophils 0.0 0.0 - 0.2 10e3/uL    Absolute Immature Granulocytes 0.0 <=0.4 10e3/uL    Absolute NRBCs 0.0 10e3/uL   Extra Blue Top Tube   Result Value Ref Range    Hold Specimen JIC    Extra Red Top Tube   Result Value Ref Range    Hold Specimen JIC    Phosphorus   Result Value Ref Range    Phosphorus 3.5 2.5 - 4.5 mg/dL   Magnesium   Result Value Ref Range    Magnesium 2.2 1.6 - 2.3 mg/dL   Creatinine   Result Value Ref Range    Creatinine 0.76 0.66 - 1.25 mg/dL    GFR Estimate >90 >60 mL/min/1.73m2   Asymptomatic COVID-19 Virus (Coronavirus) by PCR Nasopharyngeal    Specimen: Nasopharyngeal; Swab   Result Value Ref Range    SARS CoV2 PCR Negative Negative    Narrative    Testing was performed using the [employer]  SARS-CoV-2 & Influenza A/B Assay on the [employer]  System.  This test should be ordered for the detection of SARS-COV-2 in individuals who meet SARS-CoV-2 clinical and/or epidemiological criteria. Test performance is unknown in asymptomatic patients.  This test is for in vitro diagnostic use under the FDA EUA for laboratories certified under CLIA to perform moderate and/or high complexity testing. This test has not been FDA cleared or approved.  A negative test does not rule out the presence of PCR inhibitors in the specimen or target RNA in concentration below the limit of detection for the assay. The possibility of a false  negative should be considered if the patient's recent exposure or clinical presentation suggests COVID-19.  [facility] are certified under the Clinical Laboratory Improvement Amendments of 1988 (CLIA-88) as qualified to perform moderate and/or high complexity laboratory testing.   Magnesium   Result Value Ref Range    Magnesium 2.2 1.6 - 2.3 mg/dL   Phosphorus   Result Value Ref Range    Phosphorus 4.9 (H) 2.5 - 4.5 mg/dL   Potassium   Result Value Ref Range    Potassium 3.2 (L) 3.4 - 5.3 mmol/L   Glucose by meter   Result Value Ref Range    GLUCOSE BY METER POCT 101 (H) 70 - 99 mg/dL      None     Attestation:  I have reviewed today's vital signs, notes, medications, labs and imaging.  Total time: 35 minutes     Sergei Diaz MD

## 2021-12-10 NOTE — ED NOTES
Pt on 10L oxymask. RR 16. Pt calm, no tremors, sleeping, pt snoring. HOB elevated to assist with breathing.

## 2021-12-11 ENCOUNTER — APPOINTMENT (OUTPATIENT)
Dept: GENERAL RADIOLOGY | Facility: CLINIC | Age: 36
End: 2021-12-11
Attending: INTERNAL MEDICINE
Payer: COMMERCIAL

## 2021-12-11 LAB
ALBUMIN SERPL-MCNC: 2.4 G/DL (ref 3.4–5)
ALBUMIN UR-MCNC: NEGATIVE MG/DL
ALP SERPL-CCNC: 51 U/L (ref 40–150)
ALT SERPL W P-5'-P-CCNC: 54 U/L (ref 0–70)
ANION GAP SERPL CALCULATED.3IONS-SCNC: 6 MMOL/L (ref 3–14)
APPEARANCE UR: CLEAR
AST SERPL W P-5'-P-CCNC: 83 U/L (ref 0–45)
BASE EXCESS BLDV CALC-SCNC: -0.7 MMOL/L (ref -7.7–1.9)
BILIRUB SERPL-MCNC: 1.4 MG/DL (ref 0.2–1.3)
BILIRUB UR QL STRIP: NEGATIVE
BUN SERPL-MCNC: 7 MG/DL (ref 7–30)
CALCIUM SERPL-MCNC: 7.4 MG/DL (ref 8.5–10.1)
CHLORIDE BLD-SCNC: 113 MMOL/L (ref 94–109)
CO2 SERPL-SCNC: 22 MMOL/L (ref 20–32)
COLOR UR AUTO: YELLOW
CREAT SERPL-MCNC: 0.87 MG/DL (ref 0.66–1.25)
ERYTHROCYTE [DISTWIDTH] IN BLOOD BY AUTOMATED COUNT: 11.3 % (ref 10–15)
GFR SERPL CREATININE-BSD FRML MDRD: >90 ML/MIN/1.73M2
GLUCOSE BLD-MCNC: 209 MG/DL (ref 70–99)
GLUCOSE BLDC GLUCOMTR-MCNC: 107 MG/DL (ref 70–99)
GLUCOSE BLDC GLUCOMTR-MCNC: 107 MG/DL (ref 70–99)
GLUCOSE BLDC GLUCOMTR-MCNC: 108 MG/DL (ref 70–99)
GLUCOSE BLDC GLUCOMTR-MCNC: 109 MG/DL (ref 70–99)
GLUCOSE BLDC GLUCOMTR-MCNC: 115 MG/DL (ref 70–99)
GLUCOSE BLDC GLUCOMTR-MCNC: 118 MG/DL (ref 70–99)
GLUCOSE UR STRIP-MCNC: NEGATIVE MG/DL
HCO3 BLDV-SCNC: 24 MMOL/L (ref 21–28)
HCT VFR BLD AUTO: 38.1 % (ref 40–53)
HGB BLD-MCNC: 13.1 G/DL (ref 13.3–17.7)
HGB UR QL STRIP: NEGATIVE
KETONES UR STRIP-MCNC: 10 MG/DL
LEUKOCYTE ESTERASE UR QL STRIP: NEGATIVE
MAGNESIUM SERPL-MCNC: 1.7 MG/DL (ref 1.6–2.3)
MCH RBC QN AUTO: 34.7 PG (ref 26.5–33)
MCHC RBC AUTO-ENTMCNC: 34.4 G/DL (ref 31.5–36.5)
MCV RBC AUTO: 101 FL (ref 78–100)
MUCOUS THREADS #/AREA URNS LPF: PRESENT /LPF
NITRATE UR QL: NEGATIVE
O2/TOTAL GAS SETTING VFR VENT: 6 %
PCO2 BLDV: 37 MM HG (ref 40–50)
PH BLDV: 7.41 [PH] (ref 7.32–7.43)
PH UR STRIP: 5.5 [PH] (ref 5–7)
PHOSPHATE SERPL-MCNC: 1.6 MG/DL (ref 2.5–4.5)
PLATELET # BLD AUTO: 83 10E3/UL (ref 150–450)
PO2 BLDV: 67 MM HG (ref 25–47)
POTASSIUM BLD-SCNC: 3.7 MMOL/L (ref 3.4–5.3)
POTASSIUM BLD-SCNC: 3.7 MMOL/L (ref 3.4–5.3)
PROCALCITONIN SERPL-MCNC: 2.43 NG/ML
PROT SERPL-MCNC: 5.6 G/DL (ref 6.8–8.8)
RBC # BLD AUTO: 3.77 10E6/UL (ref 4.4–5.9)
RBC URINE: 0 /HPF
SODIUM SERPL-SCNC: 141 MMOL/L (ref 133–144)
SP GR UR STRIP: 1.02 (ref 1–1.03)
UROBILINOGEN UR STRIP-MCNC: NORMAL MG/DL
WBC # BLD AUTO: 6.8 10E3/UL (ref 4–11)
WBC URINE: 1 /HPF

## 2021-12-11 PROCEDURE — 250N000013 HC RX MED GY IP 250 OP 250 PS 637: Performed by: EMERGENCY MEDICINE

## 2021-12-11 PROCEDURE — 85027 COMPLETE CBC AUTOMATED: CPT | Performed by: INTERNAL MEDICINE

## 2021-12-11 PROCEDURE — 258N000003 HC RX IP 258 OP 636: Performed by: INTERNAL MEDICINE

## 2021-12-11 PROCEDURE — 82803 BLOOD GASES ANY COMBINATION: CPT | Performed by: INTERNAL MEDICINE

## 2021-12-11 PROCEDURE — 36415 COLL VENOUS BLD VENIPUNCTURE: CPT | Performed by: INTERNAL MEDICINE

## 2021-12-11 PROCEDURE — 84100 ASSAY OF PHOSPHORUS: CPT | Performed by: INTERNAL MEDICINE

## 2021-12-11 PROCEDURE — 96375 TX/PRO/DX INJ NEW DRUG ADDON: CPT

## 2021-12-11 PROCEDURE — 250N000009 HC RX 250: Performed by: INTERNAL MEDICINE

## 2021-12-11 PROCEDURE — 250N000011 HC RX IP 250 OP 636: Performed by: INTERNAL MEDICINE

## 2021-12-11 PROCEDURE — 83735 ASSAY OF MAGNESIUM: CPT | Performed by: INTERNAL MEDICINE

## 2021-12-11 PROCEDURE — 96376 TX/PRO/DX INJ SAME DRUG ADON: CPT

## 2021-12-11 PROCEDURE — 200N000001 HC R&B ICU

## 2021-12-11 PROCEDURE — 81001 URINALYSIS AUTO W/SCOPE: CPT | Performed by: INTERNAL MEDICINE

## 2021-12-11 PROCEDURE — 71045 X-RAY EXAM CHEST 1 VIEW: CPT

## 2021-12-11 PROCEDURE — 99233 SBSQ HOSP IP/OBS HIGH 50: CPT | Performed by: INTERNAL MEDICINE

## 2021-12-11 PROCEDURE — 84145 PROCALCITONIN (PCT): CPT | Performed by: INTERNAL MEDICINE

## 2021-12-11 PROCEDURE — 80053 COMPREHEN METABOLIC PANEL: CPT | Performed by: INTERNAL MEDICINE

## 2021-12-11 PROCEDURE — C9113 INJ PANTOPRAZOLE SODIUM, VIA: HCPCS | Performed by: INTERNAL MEDICINE

## 2021-12-11 PROCEDURE — 87040 BLOOD CULTURE FOR BACTERIA: CPT | Performed by: INTERNAL MEDICINE

## 2021-12-11 RX ORDER — LORAZEPAM 2 MG/ML
1-2 INJECTION INTRAMUSCULAR EVERY 30 MIN PRN
Status: DISCONTINUED | OUTPATIENT
Start: 2021-12-11 | End: 2021-12-11

## 2021-12-11 RX ORDER — FLUMAZENIL 0.1 MG/ML
0.2 INJECTION, SOLUTION INTRAVENOUS
Status: DISCONTINUED | OUTPATIENT
Start: 2021-12-11 | End: 2021-12-14 | Stop reason: HOSPADM

## 2021-12-11 RX ORDER — METOPROLOL TARTRATE 1 MG/ML
5 INJECTION, SOLUTION INTRAVENOUS EVERY 6 HOURS PRN
Status: DISCONTINUED | OUTPATIENT
Start: 2021-12-11 | End: 2021-12-14 | Stop reason: HOSPADM

## 2021-12-11 RX ORDER — LIDOCAINE 40 MG/G
CREAM TOPICAL
Status: ACTIVE | OUTPATIENT
Start: 2021-12-11 | End: 2021-12-14

## 2021-12-11 RX ORDER — DIAZEPAM 10 MG/2ML
5-10 INJECTION, SOLUTION INTRAMUSCULAR; INTRAVENOUS EVERY 30 MIN PRN
Status: DISCONTINUED | OUTPATIENT
Start: 2021-12-11 | End: 2021-12-14 | Stop reason: HOSPADM

## 2021-12-11 RX ORDER — FOLIC ACID 5 MG/ML
1 INJECTION, SOLUTION INTRAMUSCULAR; INTRAVENOUS; SUBCUTANEOUS DAILY
Status: DISCONTINUED | OUTPATIENT
Start: 2021-12-12 | End: 2021-12-11

## 2021-12-11 RX ORDER — GABAPENTIN 300 MG/1
600 CAPSULE ORAL EVERY 8 HOURS
Status: DISCONTINUED | OUTPATIENT
Start: 2021-12-14 | End: 2021-12-11

## 2021-12-11 RX ORDER — LORAZEPAM 1 MG/1
1-2 TABLET ORAL EVERY 30 MIN PRN
Status: DISCONTINUED | OUTPATIENT
Start: 2021-12-11 | End: 2021-12-11

## 2021-12-11 RX ORDER — DIAZEPAM 10 MG
10 TABLET ORAL EVERY 30 MIN PRN
Status: DISCONTINUED | OUTPATIENT
Start: 2021-12-11 | End: 2021-12-14 | Stop reason: HOSPADM

## 2021-12-11 RX ORDER — CEFTRIAXONE 2 G/1
2 INJECTION, POWDER, FOR SOLUTION INTRAMUSCULAR; INTRAVENOUS EVERY 24 HOURS
Status: DISCONTINUED | OUTPATIENT
Start: 2021-12-11 | End: 2021-12-12

## 2021-12-11 RX ORDER — GABAPENTIN 300 MG/1
300 CAPSULE ORAL EVERY 8 HOURS
Status: DISCONTINUED | OUTPATIENT
Start: 2021-12-16 | End: 2021-12-11

## 2021-12-11 RX ORDER — GABAPENTIN 300 MG/1
900 CAPSULE ORAL EVERY 8 HOURS
Status: DISCONTINUED | OUTPATIENT
Start: 2021-12-11 | End: 2021-12-11

## 2021-12-11 RX ORDER — GABAPENTIN 100 MG/1
100 CAPSULE ORAL EVERY 8 HOURS
Status: DISCONTINUED | OUTPATIENT
Start: 2021-12-18 | End: 2021-12-11

## 2021-12-11 RX ORDER — CLONIDINE HYDROCHLORIDE 0.1 MG/1
0.1 TABLET ORAL EVERY 8 HOURS
Status: DISCONTINUED | OUTPATIENT
Start: 2021-12-11 | End: 2021-12-11

## 2021-12-11 RX ORDER — GABAPENTIN 600 MG/1
1200 TABLET ORAL ONCE
Status: DISCONTINUED | OUTPATIENT
Start: 2021-12-11 | End: 2021-12-11

## 2021-12-11 RX ORDER — FLUMAZENIL 0.1 MG/ML
0.2 INJECTION, SOLUTION INTRAVENOUS
Status: DISCONTINUED | OUTPATIENT
Start: 2021-12-11 | End: 2021-12-11

## 2021-12-11 RX ORDER — FOLIC ACID 5 MG/ML
1 INJECTION, SOLUTION INTRAMUSCULAR; INTRAVENOUS; SUBCUTANEOUS ONCE
Status: COMPLETED | OUTPATIENT
Start: 2021-12-11 | End: 2021-12-12

## 2021-12-11 RX ORDER — ACETAMINOPHEN 325 MG/1
975 TABLET ORAL ONCE
Status: DISCONTINUED | OUTPATIENT
Start: 2021-12-11 | End: 2021-12-11 | Stop reason: CLARIF

## 2021-12-11 RX ORDER — FOLIC ACID 1 MG/1
1 TABLET ORAL DAILY
Status: DISCONTINUED | OUTPATIENT
Start: 2021-12-14 | End: 2021-12-11

## 2021-12-11 RX ORDER — MULTIPLE VITAMINS W/ MINERALS TAB 9MG-400MCG
1 TAB ORAL DAILY
Status: DISCONTINUED | OUTPATIENT
Start: 2021-12-11 | End: 2021-12-14 | Stop reason: HOSPADM

## 2021-12-11 RX ORDER — ACETAMINOPHEN 650 MG/1
650 SUPPOSITORY RECTAL ONCE
Status: COMPLETED | OUTPATIENT
Start: 2021-12-11 | End: 2021-12-11

## 2021-12-11 RX ADMIN — DEXMEDETOMIDINE 0.8 MCG/KG/HR: 100 INJECTION, SOLUTION, CONCENTRATE INTRAVENOUS at 23:18

## 2021-12-11 RX ADMIN — THIAMINE HYDROCHLORIDE 200 MG: 100 INJECTION, SOLUTION INTRAMUSCULAR; INTRAVENOUS at 14:05

## 2021-12-11 RX ADMIN — PANTOPRAZOLE SODIUM 40 MG: 40 INJECTION, POWDER, FOR SOLUTION INTRAVENOUS at 08:23

## 2021-12-11 RX ADMIN — LORAZEPAM 2 MG: 2 INJECTION INTRAMUSCULAR; INTRAVENOUS at 00:54

## 2021-12-11 RX ADMIN — LORAZEPAM 2 MG: 2 INJECTION INTRAMUSCULAR; INTRAVENOUS at 10:00

## 2021-12-11 RX ADMIN — LORAZEPAM 2 MG: 2 INJECTION INTRAMUSCULAR; INTRAVENOUS at 09:32

## 2021-12-11 RX ADMIN — HALOPERIDOL LACTATE 5 MG: 5 INJECTION, SOLUTION INTRAMUSCULAR at 10:00

## 2021-12-11 RX ADMIN — THIAMINE HYDROCHLORIDE 200 MG: 100 INJECTION, SOLUTION INTRAMUSCULAR; INTRAVENOUS at 20:11

## 2021-12-11 RX ADMIN — THIAMINE HYDROCHLORIDE 200 MG: 100 INJECTION, SOLUTION INTRAMUSCULAR; INTRAVENOUS at 08:23

## 2021-12-11 RX ADMIN — LORAZEPAM 2 MG: 2 INJECTION INTRAMUSCULAR; INTRAVENOUS at 05:36

## 2021-12-11 RX ADMIN — POTASSIUM CHLORIDE, DEXTROSE MONOHYDRATE AND SODIUM CHLORIDE: 150; 5; 450 INJECTION, SOLUTION INTRAVENOUS at 18:21

## 2021-12-11 RX ADMIN — ACETAMINOPHEN 650 MG: 650 SUPPOSITORY RECTAL at 05:11

## 2021-12-11 RX ADMIN — VALPROATE SODIUM 250 MG: 100 INJECTION, SOLUTION INTRAVENOUS at 12:50

## 2021-12-11 RX ADMIN — LORAZEPAM 2 MG: 2 INJECTION INTRAMUSCULAR; INTRAVENOUS at 02:38

## 2021-12-11 RX ADMIN — ENOXAPARIN SODIUM 40 MG: 40 INJECTION SUBCUTANEOUS at 14:05

## 2021-12-11 RX ADMIN — CEFTRIAXONE 2 G: 2 INJECTION, POWDER, FOR SOLUTION INTRAMUSCULAR; INTRAVENOUS at 12:41

## 2021-12-11 RX ADMIN — DEXMEDETOMIDINE 0.3 MCG/KG/HR: 100 INJECTION, SOLUTION, CONCENTRATE INTRAVENOUS at 02:51

## 2021-12-11 RX ADMIN — LORAZEPAM 2 MG: 2 INJECTION INTRAMUSCULAR; INTRAVENOUS at 05:00

## 2021-12-11 RX ADMIN — POTASSIUM CHLORIDE, DEXTROSE MONOHYDRATE AND SODIUM CHLORIDE: 150; 5; 450 INJECTION, SOLUTION INTRAVENOUS at 09:04

## 2021-12-11 RX ADMIN — SODIUM PHOSPHATE, MONOBASIC, MONOHYDRATE AND SODIUM PHOSPHATE, DIBASIC, ANHYDROUS 15 MMOL: 276; 142 INJECTION, SOLUTION INTRAVENOUS at 15:28

## 2021-12-11 RX ADMIN — LORAZEPAM 2 MG: 2 INJECTION INTRAMUSCULAR; INTRAVENOUS at 06:38

## 2021-12-11 RX ADMIN — LORAZEPAM 2 MG: 2 INJECTION INTRAMUSCULAR; INTRAVENOUS at 08:08

## 2021-12-11 RX ADMIN — LORAZEPAM 2 MG: 2 INJECTION INTRAMUSCULAR; INTRAVENOUS at 02:17

## 2021-12-11 RX ADMIN — DEXMEDETOMIDINE 0.7 MCG/KG/HR: 100 INJECTION, SOLUTION, CONCENTRATE INTRAVENOUS at 15:28

## 2021-12-11 RX ADMIN — FOLIC ACID 1 MG: 5 INJECTION, SOLUTION INTRAMUSCULAR; INTRAVENOUS; SUBCUTANEOUS at 08:23

## 2021-12-11 RX ADMIN — DIAZEPAM 5 MG: 5 INJECTION, SOLUTION INTRAMUSCULAR; INTRAVENOUS at 16:52

## 2021-12-11 ASSESSMENT — ACTIVITIES OF DAILY LIVING (ADL)
ADLS_ACUITY_SCORE: 22
ADLS_ACUITY_SCORE: 12
ADLS_ACUITY_SCORE: 22
ADLS_ACUITY_SCORE: 12
ADLS_ACUITY_SCORE: 20
ADLS_ACUITY_SCORE: 12
ADLS_ACUITY_SCORE: 22
ADLS_ACUITY_SCORE: 12
ADLS_ACUITY_SCORE: 22
ADLS_ACUITY_SCORE: 22
ADLS_ACUITY_SCORE: 20
ADLS_ACUITY_SCORE: 22
ADLS_ACUITY_SCORE: 20
ADLS_ACUITY_SCORE: 20
ADLS_ACUITY_SCORE: 22
ADLS_ACUITY_SCORE: 24
ADLS_ACUITY_SCORE: 22
ADLS_ACUITY_SCORE: 22
ADLS_ACUITY_SCORE: 24
ADLS_ACUITY_SCORE: 24
ADLS_ACUITY_SCORE: 12
ADLS_ACUITY_SCORE: 22

## 2021-12-11 ASSESSMENT — MIFFLIN-ST. JEOR: SCORE: 1679.23

## 2021-12-11 NOTE — PROGRESS NOTES
Noted order for PICC placement. Due to 12/11 pending BC, PICC is on hold.  Bedside RN is able to manage medications with peripheral access at this time.  Romeo Lancaster RN on 12/11/2021 at 12:09 PM

## 2021-12-11 NOTE — ED NOTES
Pt has been sleeping since approx 1930. Vital signs stable, even and unlabored respirations, no tremors noted while sleeping. Brown drool noted and pt's girlfriend believes pt may have had small amount of chewing tobacco hidden in cheek.

## 2021-12-11 NOTE — PLAN OF CARE
ICU End of Shift Summary.  For vital signs and complete assessments, please see documentation flowsheets.      Pertinent assessments:   Pt is disoriented x 3, intermittent hallucinations. LS clear with coarse sounds in LLL on 6L oxymask, tachypneic RR 30-40. Productive cough, brown/red sputum. Tele SR. BS active, unknown last BM. Scott patent and good UOP. Skin clean and dry, slight blanchable redness to bottom, otherwise slight pale. CXR showed slight pleural effusion & atelectasis.  PIV x 3 infusing Precedex, D5 1/2 NS w/ 20 KCl, & TKO. Pt been febrile, T max 105.7 F current 101.7 F - ice applied.     Major Shift Events:     Admitted to unit 1000    BC drawn and pending    Titrated precedex gtt    Started IV Rocephin    Placed scott @ 1230    Replacing Phos  Plan (Upcoming Events): Continue withdrawal protocol, CIWA, ABX, & IVF. Monitor fever.  Discharge/Transfer Needs: TBD     Bedside Shift Report Completed : Y  Bedside Safety Check Completed: HUEY

## 2021-12-11 NOTE — PROGRESS NOTES
Hospitalist Medicine Progress Note   [facility]       Satya Riley is a relatively healthy 36-year-old gentleman not on any medications at home though may have sleep apnea.  He drinks between 12 and 16 beers a day which she has been doing for a year and the significant other states that this is due to increased stressors in his life, his last drink was on Sunday, 12/5/2021 starting her on Wednesday, 12/8/2021 he started having visual hallucinations seeing people was brought to [facility] 12/10/2021 with esteban alcohol withdrawal delirium tremens and since have been treated with valproic acid and Precedex drip.  He went also had aspiration after vomitus was transferred to ICU 12/11/2021       Date of Admission:  12/10/2021  Assessment & Plan     Acute alcohol withdrawal  Delirium tremens  We will start patient on benzodiazepine CIWA protocol with as needed lorazepam  Will discontinue valproic acid  Monitor CIWA scale and dose lorazepam accordingly      Probable Aspiration Pneumonia  Acute hypoxic respiratory failure due to above  Probable left lower lobe atelectasis versus infiltrate  Patient was started on ceftriaxone 12/11/2021 with history of penicillin allergy    Alcohol abuse  Alcohol liver disease  Thrombocytopenia - probably due to alcohol abuse  May need CD before discharge         Plan:   Start Benzodiazepines   Ceftriaxone   Discontinue Valproic acid   CBC and CMP in am       Diet: NPO for Medical/Clinical Reasons Except for: Ice Chips, Meds    DVT Prophylaxis: Enoxaparin (Lovenox) SQ  Nicholas Catheter: Not present  Code Status: Full Code               The patient's care was discussed with the Patient's SO who accompanies him to the Hospital .    García Bond MD  Hospitalist Service  [facility]    ______________________________________________________________________    Interval History     Symptoms   Patient was able to transfer to ICU  today from the emergency department  Unable to elicit any meaningful history out of    Review of Systems:   Patient confused as mentioned above    Data reviewed today: I reviewed all medications, new labs and imaging results over the last 24 hours.     Physical Exam   Vital Signs: Temp: (!) 102.1  F (38.9  C) Temp src: Temporal BP: 122/69 Pulse: 97   Resp: 24 SpO2: 94 % O2 Device: Nasal cannula Oxygen Delivery: 2 LPM  Weight: 140 lbs 0 oz      GENERAL: Patient does not seem to be in acute distress at the present time though at times she has tremors  HEENT: Unable to elicit EOM,Conjunctiva is clear   NECK:  no Jugular Venous distention  HEART: S1 S2 tachycardia is present,   LUNGS: Respirations are not laboured, Lungs have crackles in the right lung base to auscultation without Wheezing   ABDOMEN: Soft , there is no tenderness ,Bowel Sounds are  Positive   LOWER LIMBS: no  Pedal Edema  Bilaterally   CNS:  Alert, and confused looks like he is moving all his 4 extremities    Data   Recent Labs   Lab 12/11/21  0219 12/10/21  2106 12/10/21  1237 12/10/21  1222 12/10/21  0953   WBC  --   --   --   --  4.2   HGB  --   --   --   --  14.0   MCV  --   --   --   --  97   PLT  --   --   --   --  84*   NA  --   --   --   --  134   POTASSIUM  --   --   --  3.2* 3.2*   CHLORIDE  --   --   --   --  102   CO2  --   --   --   --  24   BUN  --   --   --   --  6*   CR  --   --   --   --  0.76  0.76   ANIONGAP  --   --   --   --  8   SAFIA  --   --   --   --  9.1   * 133* 101*  --  135*   ALBUMIN  --   --   --   --  3.7   PROTTOTAL  --   --   --   --  7.1   BILITOTAL  --   --   --   --  1.8*   ALKPHOS  --   --   --   --  59   ALT  --   --   --   --  60   AST  --   --   --   --  42         No results found for this or any previous visit (from the past 24 hour(s)).

## 2021-12-11 NOTE — ED NOTES
Pt found agitated and spitting. Spit had some blood colored tinge. Pt found with small lac to lower R gum line. Pt unable to say how it happened. GF states pt chews, but no chew found in pt mouth. Oral cares completed. Will continue to monitor.

## 2021-12-11 NOTE — ED NOTES
Pt began weaning off Precedex drip at approx 2330 per Dr. Bagley's verbal orders of decreasing by 0.1 every 30 to 60 minutes. Pt currently at 0.3 with no increase in tremors or agitation.

## 2021-12-11 NOTE — ED NOTES
Bed: ED13  Expected date: 12/11/21  Expected time: 1:35 AM  Means of arrival:   Comments:  Room 39

## 2021-12-12 LAB
ALBUMIN SERPL-MCNC: 2 G/DL (ref 3.4–5)
ALP SERPL-CCNC: 53 U/L (ref 40–150)
ALT SERPL W P-5'-P-CCNC: 47 U/L (ref 0–70)
ANION GAP SERPL CALCULATED.3IONS-SCNC: 7 MMOL/L (ref 3–14)
AST SERPL W P-5'-P-CCNC: 53 U/L (ref 0–45)
BILIRUB SERPL-MCNC: 1 MG/DL (ref 0.2–1.3)
BUN SERPL-MCNC: 7 MG/DL (ref 7–30)
CALCIUM SERPL-MCNC: 7.6 MG/DL (ref 8.5–10.1)
CHLORIDE BLD-SCNC: 109 MMOL/L (ref 94–109)
CO2 SERPL-SCNC: 21 MMOL/L (ref 20–32)
CREAT SERPL-MCNC: 0.79 MG/DL (ref 0.66–1.25)
ERYTHROCYTE [DISTWIDTH] IN BLOOD BY AUTOMATED COUNT: 11.2 % (ref 10–15)
GFR SERPL CREATININE-BSD FRML MDRD: >90 ML/MIN/1.73M2
GLUCOSE BLD-MCNC: 117 MG/DL (ref 70–99)
GLUCOSE BLDC GLUCOMTR-MCNC: 119 MG/DL (ref 70–99)
GLUCOSE BLDC GLUCOMTR-MCNC: 128 MG/DL (ref 70–99)
HCT VFR BLD AUTO: 35.9 % (ref 40–53)
HGB BLD-MCNC: 12.2 G/DL (ref 13.3–17.7)
MAGNESIUM SERPL-MCNC: 1.8 MG/DL (ref 1.6–2.3)
MCH RBC QN AUTO: 33.1 PG (ref 26.5–33)
MCHC RBC AUTO-ENTMCNC: 34 G/DL (ref 31.5–36.5)
MCV RBC AUTO: 97 FL (ref 78–100)
PHOSPHATE SERPL-MCNC: 1.7 MG/DL (ref 2.5–4.5)
PHOSPHATE SERPL-MCNC: 2.3 MG/DL (ref 2.5–4.5)
PLATELET # BLD AUTO: 59 10E3/UL (ref 150–450)
POTASSIUM BLD-SCNC: 3.3 MMOL/L (ref 3.4–5.3)
POTASSIUM BLD-SCNC: 3.3 MMOL/L (ref 3.4–5.3)
PROT SERPL-MCNC: 5.2 G/DL (ref 6.8–8.8)
RBC # BLD AUTO: 3.69 10E6/UL (ref 4.4–5.9)
SODIUM SERPL-SCNC: 137 MMOL/L (ref 133–144)
WBC # BLD AUTO: 10.4 10E3/UL (ref 4–11)

## 2021-12-12 PROCEDURE — 84100 ASSAY OF PHOSPHORUS: CPT | Performed by: INTERNAL MEDICINE

## 2021-12-12 PROCEDURE — 120N000001 HC R&B MED SURG/OB

## 2021-12-12 PROCEDURE — C9113 INJ PANTOPRAZOLE SODIUM, VIA: HCPCS | Performed by: INTERNAL MEDICINE

## 2021-12-12 PROCEDURE — 250N000009 HC RX 250: Performed by: INTERNAL MEDICINE

## 2021-12-12 PROCEDURE — 250N000011 HC RX IP 250 OP 636: Performed by: INTERNAL MEDICINE

## 2021-12-12 PROCEDURE — 80053 COMPREHEN METABOLIC PANEL: CPT | Performed by: INTERNAL MEDICINE

## 2021-12-12 PROCEDURE — 83735 ASSAY OF MAGNESIUM: CPT | Performed by: INTERNAL MEDICINE

## 2021-12-12 PROCEDURE — 258N000003 HC RX IP 258 OP 636: Performed by: INTERNAL MEDICINE

## 2021-12-12 PROCEDURE — 36415 COLL VENOUS BLD VENIPUNCTURE: CPT | Performed by: INTERNAL MEDICINE

## 2021-12-12 PROCEDURE — 84132 ASSAY OF SERUM POTASSIUM: CPT | Performed by: INTERNAL MEDICINE

## 2021-12-12 PROCEDURE — 85027 COMPLETE CBC AUTOMATED: CPT | Performed by: INTERNAL MEDICINE

## 2021-12-12 PROCEDURE — 250N000013 HC RX MED GY IP 250 OP 250 PS 637: Performed by: INTERNAL MEDICINE

## 2021-12-12 PROCEDURE — 99233 SBSQ HOSP IP/OBS HIGH 50: CPT | Performed by: INTERNAL MEDICINE

## 2021-12-12 RX ORDER — NICOTINE 21 MG/24HR
1 PATCH, TRANSDERMAL 24 HOURS TRANSDERMAL DAILY
Status: DISCONTINUED | OUTPATIENT
Start: 2021-12-12 | End: 2021-12-14 | Stop reason: HOSPADM

## 2021-12-12 RX ORDER — LEVOFLOXACIN 5 MG/ML
500 INJECTION, SOLUTION INTRAVENOUS EVERY 24 HOURS
Status: DISCONTINUED | OUTPATIENT
Start: 2021-12-12 | End: 2021-12-14 | Stop reason: HOSPADM

## 2021-12-12 RX ORDER — POTASSIUM CHLORIDE 1.5 G/1.58G
20 POWDER, FOR SOLUTION ORAL ONCE
Status: COMPLETED | OUTPATIENT
Start: 2021-12-12 | End: 2021-12-12

## 2021-12-12 RX ADMIN — ENOXAPARIN SODIUM 40 MG: 40 INJECTION SUBCUTANEOUS at 13:40

## 2021-12-12 RX ADMIN — PANTOPRAZOLE SODIUM 40 MG: 40 INJECTION, POWDER, FOR SOLUTION INTRAVENOUS at 08:45

## 2021-12-12 RX ADMIN — DIAZEPAM 10 MG: 5 INJECTION, SOLUTION INTRAMUSCULAR; INTRAVENOUS at 12:19

## 2021-12-12 RX ADMIN — POTASSIUM & SODIUM PHOSPHATES POWDER PACK 280-160-250 MG 2 PACKET: 280-160-250 PACK at 10:42

## 2021-12-12 RX ADMIN — THIAMINE HYDROCHLORIDE 200 MG: 100 INJECTION, SOLUTION INTRAMUSCULAR; INTRAVENOUS at 08:45

## 2021-12-12 RX ADMIN — POTASSIUM & SODIUM PHOSPHATES POWDER PACK 280-160-250 MG 2 PACKET: 280-160-250 PACK at 21:59

## 2021-12-12 RX ADMIN — POTASSIUM CHLORIDE 20 MEQ: 1.5 POWDER, FOR SOLUTION ORAL at 10:42

## 2021-12-12 RX ADMIN — LEVOFLOXACIN 500 MG: 500 INJECTION, SOLUTION INTRAVENOUS at 09:22

## 2021-12-12 RX ADMIN — METRONIDAZOLE 500 MG: 500 INJECTION, SOLUTION INTRAVENOUS at 10:42

## 2021-12-12 RX ADMIN — DEXMEDETOMIDINE 0.8 MCG/KG/HR: 100 INJECTION, SOLUTION, CONCENTRATE INTRAVENOUS at 06:52

## 2021-12-12 RX ADMIN — POTASSIUM CHLORIDE, DEXTROSE MONOHYDRATE AND SODIUM CHLORIDE: 150; 5; 450 INJECTION, SOLUTION INTRAVENOUS at 02:59

## 2021-12-12 RX ADMIN — POTASSIUM & SODIUM PHOSPHATES POWDER PACK 280-160-250 MG 2 PACKET: 280-160-250 PACK at 18:37

## 2021-12-12 RX ADMIN — FOLIC ACID 1 MG: 5 INJECTION, SOLUTION INTRAMUSCULAR; INTRAVENOUS; SUBCUTANEOUS at 08:46

## 2021-12-12 RX ADMIN — DIAZEPAM 5 MG: 5 INJECTION, SOLUTION INTRAMUSCULAR; INTRAVENOUS at 19:01

## 2021-12-12 RX ADMIN — DIAZEPAM 10 MG: 5 INJECTION, SOLUTION INTRAMUSCULAR; INTRAVENOUS at 10:42

## 2021-12-12 RX ADMIN — DIAZEPAM 10 MG: 5 INJECTION, SOLUTION INTRAMUSCULAR; INTRAVENOUS at 13:40

## 2021-12-12 RX ADMIN — MULTIPLE VITAMINS W/ MINERALS TAB 1 TABLET: TAB at 08:46

## 2021-12-12 RX ADMIN — NICOTINE 1 PATCH: 21 PATCH, EXTENDED RELEASE TRANSDERMAL at 12:09

## 2021-12-12 RX ADMIN — METRONIDAZOLE 500 MG: 500 INJECTION, SOLUTION INTRAVENOUS at 19:32

## 2021-12-12 ASSESSMENT — ACTIVITIES OF DAILY LIVING (ADL)
ADLS_ACUITY_SCORE: 20
ADLS_ACUITY_SCORE: 18
ADLS_ACUITY_SCORE: 20

## 2021-12-12 ASSESSMENT — MIFFLIN-ST. JEOR: SCORE: 1653.38

## 2021-12-12 NOTE — PLAN OF CARE
ICU End of Shift Summary.  For vital signs and complete assessments, please see documentation flowsheets.     Pertinent assessments: Patient sedated to RASS of -1. Lung sounds diminished throughout. Tele is SR. Abdomen is soft and non-tender. Nicholas catheter patent with good urine output. No skin concerns.   Major Shift Events: Titrated precedex down due to over sedation. Patient had 1 episode of agitation, treated with PRN valium, patient was redirectible after medications.   Plan (Upcoming Events): Continue supportive meds, IVF, monitor withdrawal symptoms.   Discharge/Transfer Needs: TBD

## 2021-12-12 NOTE — PLAN OF CARE
ICU End of Shift Summary.  For vital signs and complete assessments, please see documentation flowsheets.     Pertinent assessments: patient remains sedated with precedex to RASS goal 0,-1. Remains confused and disoriented at times. More oriented this morning.  Inconsistent with following commands. CIWAs <7 overnight. LS diminished. Productive cough. SR. BP stable. Adequate UO through scott.  Major Shift Events: see above  Plan (Upcoming Events): continue precedex drip and prns with CIWA score.   Discharge/Transfer Needs: TBD    Bedside Shift Report Completed : y   Bedside Safety Check Completed:y

## 2021-12-12 NOTE — PROGRESS NOTES
Hospitalist Medicine Progress Note   [facility]       Satya Riley is a relatively healthy 36-year-old gentleman not on any medications at home though may have sleep apnea.  He drinks between 12 and 16 beers a day which she has been doing for a year and the significant other states that this is due to increased stressors in his life, his last drink was on Sunday, 12/5/2021 starting her on Wednesday, 12/8/2021 he started having visual hallucinations seeing people was brought to [facility] 12/10/2021 with esteban alcohol withdrawal delirium tremens and since have been treated with valproic acid and Precedex drip.  He went also had aspiration after vomitus was transferred to ICU 12/11/2021       Date of Admission:  12/10/2021  Assessment & Plan     Acute alcohol withdrawal  Delirium tremens  We will start patient on benzodiazepine CIWA protocol with as needed lorazepam  Discontinud valproic acid  Monitor CIWA scale and dose lorazepam accordingly      Probable Aspiration Pneumonia  Acute hypoxic respiratory failure due to above  Probable left lower lobe atelectasis versus infiltrate  Patient was started on ceftriaxone 12/11/2021 but was changed to Levofloxacin and Flagyl 12/12/2021 in place of ceftriaxone /2021 with history of penicillin allergy    Alcohol abuse  Alcohol liver disease  Thrombocytopenia - probably due to alcohol abuse. Is decreasing - need to monitor   May need CD before discharge     Chews tobacco  We will start on 21 mg of nicotine patch        Plan:   Change Ceftriaxone to levofloxacin as well as metronidazole for possible aspiration versus community-acquired pneumonia with elevated procalcitonin  Replace Potassium per Potassium replacement protocol   Replace phosphorus per phosphorus replacement protocol  CBC and CMP in am   Monitor Platelet count which is decreasing   Chews tobacco -nicotine patch  Transfer out of ICU to Medical Floor       Diet: NPO for  Medical/Clinical Reasons Except for: Ice Chips, Meds    DVT Prophylaxis: Enoxaparin (Lovenox) SQ  Nicholas Catheter: PRESENT, indication: Retention  Code Status: Full Code               The patient's care was discussed with the Patient's SO who accompanies him to the Hospital .    García Bond MD  Hospitalist Service  [facility]    ______________________________________________________________________    Interval History     Symptoms   Patient was alert oriented without any Hallucinations   He has cough without chest pain or SOB    Review of Systems:   No headache nausea or Vomiting  Had fever     Data reviewed today: I reviewed all medications, new labs and imaging results over the last 24 hours.     Physical Exam   Vital Signs: Temp: 100.2  F (37.9  C) Temp src: Temporal BP: 116/73 Pulse: 85   Resp: (!) 34 SpO2: 97 % O2 Device: Oxymask Oxygen Delivery: 5 LPM  Weight: 134 lbs 11.22 oz      GENERAL: Patient does not seem to be in acute distress at the present time though at times she has tremors  HEENT: Unable to elicit EOM,Conjunctiva is clear   NECK:  no Jugular Venous distention  HEART: S1 S2 tachycardia is present,   LUNGS: Respirations are not laboured, Lungs have crackles in the right lung base to auscultation without Wheezing   ABDOMEN: Soft , there is no tenderness ,Bowel Sounds are  Positive   LOWER LIMBS: no  Pedal Edema  Bilaterally   CNS:  Alert, and confused looks like he is moving all his 4 extremities    Data   Recent Labs   Lab 12/12/21  0812 12/12/21  0729 12/12/21  0338 12/11/21  1228 12/11/21  1048 12/10/21  1237 12/10/21  1222 12/10/21  0953   WBC  --  10.4  --   --  6.8  --   --  4.2   HGB  --  12.2*  --   --  13.1*  --   --  14.0   MCV  --  97  --   --  101*  --   --  97   PLT  --  59*  --   --  83*  --   --  84*   NA  --  137  --   --  141  --   --  134   POTASSIUM  --  3.3*  --   --  3.7  3.7  --  3.2* 3.2*   CHLORIDE  --  109  --   --  113*  --   --  102   CO2  --  21   --   --  22  --   --  24   BUN  --  7  --   --  7  --   --  6*   CR  --  0.79  --   --  0.87  --   --  0.76  0.76   ANIONGAP  --  7  --   --  6  --   --  8   SAFIA  --  7.6*  --   --  7.4*  --   --  9.1   * 117* 119*   < > 209*   < >  --  135*   ALBUMIN  --  2.0*  --   --  2.4*  --   --  3.7   PROTTOTAL  --  5.2*  --   --  5.6*  --   --  7.1   BILITOTAL  --  1.0  --   --  1.4*  --   --  1.8*   ALKPHOS  --  53  --   --  51  --   --  59   ALT  --  47  --   --  54  --   --  60   AST  --  53*  --   --  83*  --   --  42    < > = values in this interval not displayed.         Recent Results (from the past 24 hour(s))   XR Chest Port 1 View    Narrative    EXAM: XR CHEST PORT 1 VIEW  LOCATION: [facility]  DATE/TIME: 12/11/2021 11:15 AM    INDICATION: fever  COMPARISON: None available      Impression    IMPRESSION: Small left pleural effusion and left basilar atelectasis/consolidation. Right lung is clear. No pneumothorax. Normal heart size.

## 2021-12-12 NOTE — PLAN OF CARE
ICU End of Shift Summary.  For vital signs and complete assessments, please see documentation flowsheets.      Pertinent assessments:   Neuro: A & O x 4. Calm cooperative. Forgetful.   Cardiac: SR, BP WDL  Resp: LS dim, harsh non productive cough. Continues on 3 LPM NC  GI: BS +, BM today  : Nicholas removed this AM, Pt able to void in adequate amounts since removal.   Lines: PIV    Major Shift Events:   Titrated off precedex.   Given valium x 4 for CIWA scores per MAR  Transferred to med surg. Floor.     Plan (Upcoming Events): Transfer to Medical floor. Continue antibiotics, CIWA protocol.   Discharge/Transfer Needs: TBD     Bedside Shift Report Completed : yes  Bedside Safety Check Completed: yes

## 2021-12-13 ENCOUNTER — APPOINTMENT (OUTPATIENT)
Dept: ULTRASOUND IMAGING | Facility: CLINIC | Age: 36
End: 2021-12-13
Attending: HOSPITALIST
Payer: COMMERCIAL

## 2021-12-13 LAB
ALBUMIN SERPL-MCNC: 2.4 G/DL (ref 3.4–5)
ALP SERPL-CCNC: 61 U/L (ref 40–150)
ALT SERPL W P-5'-P-CCNC: 43 U/L (ref 0–70)
ANION GAP SERPL CALCULATED.3IONS-SCNC: 10 MMOL/L (ref 3–14)
AST SERPL W P-5'-P-CCNC: 44 U/L (ref 0–45)
BILIRUB SERPL-MCNC: 1.4 MG/DL (ref 0.2–1.3)
BUN SERPL-MCNC: 8 MG/DL (ref 7–30)
CALCIUM SERPL-MCNC: 8.7 MG/DL (ref 8.5–10.1)
CHLORIDE BLD-SCNC: 109 MMOL/L (ref 94–109)
CO2 SERPL-SCNC: 22 MMOL/L (ref 20–32)
CREAT SERPL-MCNC: 0.7 MG/DL (ref 0.66–1.25)
ERYTHROCYTE [DISTWIDTH] IN BLOOD BY AUTOMATED COUNT: 11.3 % (ref 10–15)
GFR SERPL CREATININE-BSD FRML MDRD: >90 ML/MIN/1.73M2
GLUCOSE BLD-MCNC: 80 MG/DL (ref 70–99)
HCT VFR BLD AUTO: 36.1 % (ref 40–53)
HGB BLD-MCNC: 12.4 G/DL (ref 13.3–17.7)
MAGNESIUM SERPL-MCNC: 2.1 MG/DL (ref 1.6–2.3)
MCH RBC QN AUTO: 33.3 PG (ref 26.5–33)
MCHC RBC AUTO-ENTMCNC: 34.3 G/DL (ref 31.5–36.5)
MCV RBC AUTO: 97 FL (ref 78–100)
PHOSPHATE SERPL-MCNC: 3.4 MG/DL (ref 2.5–4.5)
PLATELET # BLD AUTO: 64 10E3/UL (ref 150–450)
POTASSIUM BLD-SCNC: 3.3 MMOL/L (ref 3.4–5.3)
PROT SERPL-MCNC: 6.4 G/DL (ref 6.8–8.8)
RBC # BLD AUTO: 3.72 10E6/UL (ref 4.4–5.9)
SODIUM SERPL-SCNC: 141 MMOL/L (ref 133–144)
WBC # BLD AUTO: 8.9 10E3/UL (ref 4–11)

## 2021-12-13 PROCEDURE — 93971 EXTREMITY STUDY: CPT | Mod: RT

## 2021-12-13 PROCEDURE — 250N000011 HC RX IP 250 OP 636: Performed by: INTERNAL MEDICINE

## 2021-12-13 PROCEDURE — 250N000013 HC RX MED GY IP 250 OP 250 PS 637: Performed by: HOSPITALIST

## 2021-12-13 PROCEDURE — 36415 COLL VENOUS BLD VENIPUNCTURE: CPT | Performed by: INTERNAL MEDICINE

## 2021-12-13 PROCEDURE — 250N000013 HC RX MED GY IP 250 OP 250 PS 637: Performed by: INTERNAL MEDICINE

## 2021-12-13 PROCEDURE — C9113 INJ PANTOPRAZOLE SODIUM, VIA: HCPCS | Performed by: INTERNAL MEDICINE

## 2021-12-13 PROCEDURE — 99232 SBSQ HOSP IP/OBS MODERATE 35: CPT | Performed by: HOSPITALIST

## 2021-12-13 PROCEDURE — 120N000001 HC R&B MED SURG/OB

## 2021-12-13 PROCEDURE — 85027 COMPLETE CBC AUTOMATED: CPT | Performed by: INTERNAL MEDICINE

## 2021-12-13 PROCEDURE — 84132 ASSAY OF SERUM POTASSIUM: CPT | Performed by: HOSPITALIST

## 2021-12-13 PROCEDURE — 80069 RENAL FUNCTION PANEL: CPT | Performed by: INTERNAL MEDICINE

## 2021-12-13 PROCEDURE — 36415 COLL VENOUS BLD VENIPUNCTURE: CPT | Performed by: HOSPITALIST

## 2021-12-13 PROCEDURE — 84100 ASSAY OF PHOSPHORUS: CPT | Performed by: INTERNAL MEDICINE

## 2021-12-13 PROCEDURE — 83735 ASSAY OF MAGNESIUM: CPT | Performed by: HOSPITALIST

## 2021-12-13 RX ORDER — LEVOFLOXACIN 750 MG/1
750 TABLET, FILM COATED ORAL DAILY
Qty: 8 TABLET | Refills: 0 | Status: SHIPPED | OUTPATIENT
Start: 2021-12-13 | End: 2021-12-21

## 2021-12-13 RX ORDER — POTASSIUM CHLORIDE 1500 MG/1
20 TABLET, EXTENDED RELEASE ORAL ONCE
Status: COMPLETED | OUTPATIENT
Start: 2021-12-13 | End: 2021-12-13

## 2021-12-13 RX ORDER — NICOTINE 21 MG/24HR
1 PATCH, TRANSDERMAL 24 HOURS TRANSDERMAL ONCE
Status: DISCONTINUED | OUTPATIENT
Start: 2021-12-13 | End: 2021-12-14 | Stop reason: HOSPADM

## 2021-12-13 RX ORDER — LANOLIN ALCOHOL/MO/W.PET/CERES
3 CREAM (GRAM) TOPICAL
Status: DISCONTINUED | OUTPATIENT
Start: 2021-12-13 | End: 2021-12-14 | Stop reason: HOSPADM

## 2021-12-13 RX ADMIN — MULTIPLE VITAMINS W/ MINERALS TAB 1 TABLET: TAB at 09:12

## 2021-12-13 RX ADMIN — Medication 3 MG: at 22:50

## 2021-12-13 RX ADMIN — PANTOPRAZOLE SODIUM 40 MG: 40 INJECTION, POWDER, FOR SOLUTION INTRAVENOUS at 09:12

## 2021-12-13 RX ADMIN — POTASSIUM CHLORIDE 20 MEQ: 1500 TABLET, EXTENDED RELEASE ORAL at 14:56

## 2021-12-13 RX ADMIN — POTASSIUM CHLORIDE 20 MEQ: 1500 TABLET, EXTENDED RELEASE ORAL at 20:49

## 2021-12-13 RX ADMIN — NICOTINE 1 PATCH: 14 PATCH, EXTENDED RELEASE TRANSDERMAL at 22:51

## 2021-12-13 RX ADMIN — ENOXAPARIN SODIUM 40 MG: 40 INJECTION SUBCUTANEOUS at 14:56

## 2021-12-13 RX ADMIN — LEVOFLOXACIN 500 MG: 500 INJECTION, SOLUTION INTRAVENOUS at 09:12

## 2021-12-13 RX ADMIN — METRONIDAZOLE 500 MG: 500 INJECTION, SOLUTION INTRAVENOUS at 10:46

## 2021-12-13 RX ADMIN — METRONIDAZOLE 500 MG: 500 INJECTION, SOLUTION INTRAVENOUS at 20:49

## 2021-12-13 RX ADMIN — POTASSIUM CHLORIDE 20 MEQ: 1500 TABLET, EXTENDED RELEASE ORAL at 11:51

## 2021-12-13 RX ADMIN — NICOTINE 1 PATCH: 21 PATCH, EXTENDED RELEASE TRANSDERMAL at 10:45

## 2021-12-13 ASSESSMENT — ACTIVITIES OF DAILY LIVING (ADL)
ADLS_ACUITY_SCORE: 22
ADLS_ACUITY_SCORE: 24
ADLS_ACUITY_SCORE: 24
ADLS_ACUITY_SCORE: 22
ADLS_ACUITY_SCORE: 22
ADLS_ACUITY_SCORE: 24
ADLS_ACUITY_SCORE: 20
ADLS_ACUITY_SCORE: 22
ADLS_ACUITY_SCORE: 24
ADLS_ACUITY_SCORE: 24
ADLS_ACUITY_SCORE: 22
ADLS_ACUITY_SCORE: 24
ADLS_ACUITY_SCORE: 24
ADLS_ACUITY_SCORE: 22
ADLS_ACUITY_SCORE: 22
ADLS_ACUITY_SCORE: 24
ADLS_ACUITY_SCORE: 24
ADLS_ACUITY_SCORE: 22
ADLS_ACUITY_SCORE: 22
ADLS_ACUITY_SCORE: 24
ADLS_ACUITY_SCORE: 22
ADLS_ACUITY_SCORE: 22

## 2021-12-13 NOTE — PLAN OF CARE
Vss A&OX4. Up ind. No bm today. Tmax 99.1. new right arm redness, warmth and swelling. Skin marked. Dr. Guallpa notified and a an US was  ordered

## 2021-12-13 NOTE — DISCHARGE SUMMARY
Discharge Summary  Hospitalist Service      Satya Riley MRN# [id number]   YOB: 1985 Age: 36 year old     Date of Admission:  12/10/2021  Date of Discharge:  12/13/2021  Admitting Physician:  Sergei Diaz MD  Discharge Physician: Toma Guallpa MD   Discharging Service: Hospitalist Service     Primary Provider: [facility]  Primary Care Physician Phone Number: None         Discharge Diagnoses/Problem Oriented Hospital Course (Providers):      Discharge Diagnoses   Acute alcohol withdrawal, with Alcoholic Encephalopathy  Delirium tremens  Probable Aspiration Pneumonia  Acute hypoxic respiratory failure due to above  Alcohol abuse  Alcohol liver disease  Thrombocytopenia  Hypokalemia    Hospital Course   36-year-old gentleman not on any medications at home though may have sleep apnea.  He drinks between 12 and 16 beers a day which she has been doing for a year and the significant other states that this is due to increased stressors in his life, his last drink was on Sunday, 12/5/2021 starting her on Wednesday, 12/8/2021 he started having visual hallucinations seeing people was brought to [facility] 12/10/2021 with esteban alcohol withdrawal delirium tremens and since have been treated with valproic acid and Precedex drip.    He also had an episode of aspiration pneumonia and has been treated with IV levofloxacin and Flagyl.  He has been transferred out of the ICU 12/11/2021.  Currently patient is doing well requesting to be discharged he has been off of oxygen, withdrawal symptoms resolved, afebrile 24 hours, blood cultures negative..  He will discharged on home levofloxacin.         Code Status:      Full Code         Important Results:                  Pending Results:        Unresulted Labs Ordered in the Past 30 Days of this Admission     Date and Time Order Name Status Description    12/11/2021 10:15 AM Blood Culture Arm, Right Preliminary     12/11/2021 10:15  "AM Blood Culture Arm, Left Preliminary                Discharge Instructions and Follow-Up:      Follow-up Appointments     Follow-up and recommended labs and tests       Follow up with primary care provider, [facility], within 7   days for hospital follow- up.  The following labs/tests are recommended:   chest xray in 1 month to follow up resolution of pneumonia .                  Discharge Disposition:        Discharged to home          Discharge Medications:        Current Discharge Medication List      START taking these medications    Details   levofloxacin (LEVAQUIN) 750 MG tablet Take 1 tablet (750 mg) by mouth daily for 8 days  Qty: 8 tablet, Refills: 0    Associated Diagnoses: Pneumonia due to infectious organism, unspecified laterality, unspecified part of lung                  Allergies:         Allergies   Allergen Reactions     Penicillins             Consultations This Hospital Stay:        No consultations were requested during this admission          Condition and Physical Exam on Discharge:        Discharge condition: Stable   Discharge vitals: Blood pressure (!) 141/81, pulse 98, temperature 98.3  F (36.8  C), temperature source Oral, resp. rate 18, height 1.956 m (6' 5\"), weight 61.1 kg (134 lb 11.2 oz), SpO2 99 %.   General: Pt in NAD, normal appearance  HEENT: OP clear MMM, no JVD  Lungs: Clear to Auscultation Bilateral, normal breathing  without accessory muscle usage, no wheezing, rhonchi or crackles  Cardiac: +S1, S2, RRR, no MRG, no edema  Abdominal: normal bowel sounds, NT/ND, no hepatosplenomegaly  Skin: warm, dry, normal turgor, no rash  Psyche: A& O x3, appropriate affect            Discharge Orders for Skilled Facility (from Discharge Orders):        After Care Instructions     Activity      Your activity upon discharge: activity as tolerated         Diet      Follow this diet upon discharge: Orders Placed This Encounter      Regular Diet Adult                    Rehab " orders for Skilled Facility (from Discharge Orders):               Discharge Time:      Greater than 30 minutes.        Image Results From This Hospital Stay (For Non-EPIC Providers):        Results for orders placed or performed during the hospital encounter of 12/10/21   XR Chest Port 1 View    Narrative    EXAM: XR CHEST PORT 1 VIEW  LOCATION: [facility]  DATE/TIME: 12/11/2021 11:15 AM    INDICATION: fever  COMPARISON: None available      Impression    IMPRESSION: Small left pleural effusion and left basilar atelectasis/consolidation. Right lung is clear. No pneumothorax. Normal heart size.           Most Recent Lab Results In EPIC (For Non-EPIC Providers):    Most Recent 3 CBC's:  Recent Labs   Lab Test 12/13/21  0721 12/12/21  0729 12/11/21  1048   WBC 8.9 10.4 6.8   HGB 12.4* 12.2* 13.1*   MCV 97 97 101*   PLT 64* 59* 83*      Most Recent 3 BMP's:  Recent Labs   Lab Test 12/13/21 0721 12/12/21  1548 12/12/21  0812 12/12/21  0729 12/11/21  1228 12/11/21  1048     --   --  137  --  141   POTASSIUM 3.3* 3.3*  --  3.3*  --  3.7  3.7   CHLORIDE 109  --   --  109  --  113*   CO2 22  --   --  21  --  22   BUN 8  --   --  7  --  7   CR 0.70  --   --  0.79  --  0.87   ANIONGAP 10  --   --  7  --  6   SAFIA 8.7  --   --  7.6*  --  7.4*   GLC 80  --  128* 117*   < > 209*    < > = values in this interval not displayed.     Most Recent 3 Troponin's:No lab results found.  Most Recent 3 INR's:No lab results found.  Most Recent 2 LFT's:  Recent Labs   Lab Test 12/13/21 0721 12/12/21 0729   AST 44 53*   ALT 43 47   ALKPHOS 61 53   BILITOTAL 1.4* 1.0     Most Recent Cholesterol Panel:No lab results found.  Most Recent 6 Bacteria Isolates From Any Culture (See EPIC Reports for Culture Details):No lab results found.  Most Recent TSH, T4 and HgbA1c:No lab results found.

## 2021-12-13 NOTE — PROVIDER NOTIFICATION
Provider Notified : patient have been having sereral episodes of incontinent  loose and mucous stool. has had BM for about 4 times during this shift.  Can order be placed for Cidiff checks?

## 2021-12-13 NOTE — PLAN OF CARE
"VSS x tachycardia. Disoriented to time. Foggy memory of arrival and time in ICU. NPO x ice chips and sips of fluid with meds. CIWA-Ar scores: 9, 6. Valium given 1x during shift. Flagyl given for aspiration pneu. Tx. Up w ax1. Steady gait. Urinal in use. 3 incontinent fecal episodes. Diarrhea. Blanchable redness on acral region. meplex and barrier cream applied. Encouraged to lay on left hip to offload pressure. Reminded to use call light before getting up.    /69 (BP Location: Right arm)   Pulse 98   Temp 98.5  F (36.9  C) (Oral)   Resp 21   Ht 1.956 m (6' 5\")   Wt 61.1 kg (134 lb 11.2 oz)   SpO2 99%   BMI 15.97 kg/m     "

## 2021-12-13 NOTE — PROGRESS NOTES
A&OX3. Disoriented to time. Forgetful. VSS oxygen  99% on 3L NC. Had BM 4 times. Loose and mucous. Paged Md for an order for Cdiff. Waiting for response. NPO except for ice. Continue to monitor.

## 2021-12-14 VITALS
DIASTOLIC BLOOD PRESSURE: 70 MMHG | BODY MASS INDEX: 15.9 KG/M2 | WEIGHT: 134.7 LBS | SYSTOLIC BLOOD PRESSURE: 114 MMHG | OXYGEN SATURATION: 96 % | HEART RATE: 127 BPM | RESPIRATION RATE: 18 BRPM | HEIGHT: 77 IN | TEMPERATURE: 97.8 F

## 2021-12-14 LAB
PHOSPHATE SERPL-MCNC: 3.5 MG/DL (ref 2.5–4.5)
POTASSIUM BLD-SCNC: 3.5 MMOL/L (ref 3.4–5.3)

## 2021-12-14 PROCEDURE — 84100 ASSAY OF PHOSPHORUS: CPT | Performed by: HOSPITALIST

## 2021-12-14 PROCEDURE — C9113 INJ PANTOPRAZOLE SODIUM, VIA: HCPCS | Performed by: INTERNAL MEDICINE

## 2021-12-14 PROCEDURE — 84132 ASSAY OF SERUM POTASSIUM: CPT | Performed by: HOSPITALIST

## 2021-12-14 PROCEDURE — 99239 HOSP IP/OBS DSCHRG MGMT >30: CPT | Performed by: HOSPITALIST

## 2021-12-14 PROCEDURE — 36415 COLL VENOUS BLD VENIPUNCTURE: CPT | Performed by: HOSPITALIST

## 2021-12-14 PROCEDURE — 250N000011 HC RX IP 250 OP 636: Performed by: INTERNAL MEDICINE

## 2021-12-14 PROCEDURE — 93005 ELECTROCARDIOGRAM TRACING: CPT

## 2021-12-14 PROCEDURE — 250N000013 HC RX MED GY IP 250 OP 250 PS 637: Performed by: INTERNAL MEDICINE

## 2021-12-14 RX ADMIN — NICOTINE 1 PATCH: 21 PATCH, EXTENDED RELEASE TRANSDERMAL at 08:58

## 2021-12-14 RX ADMIN — LEVOFLOXACIN 500 MG: 500 INJECTION, SOLUTION INTRAVENOUS at 09:01

## 2021-12-14 RX ADMIN — METRONIDAZOLE 500 MG: 500 INJECTION, SOLUTION INTRAVENOUS at 10:14

## 2021-12-14 RX ADMIN — MULTIPLE VITAMINS W/ MINERALS TAB 1 TABLET: TAB at 08:58

## 2021-12-14 RX ADMIN — PANTOPRAZOLE SODIUM 40 MG: 40 INJECTION, POWDER, FOR SOLUTION INTRAVENOUS at 08:57

## 2021-12-14 ASSESSMENT — ACTIVITIES OF DAILY LIVING (ADL)
ADLS_ACUITY_SCORE: 24
ADLS_ACUITY_SCORE: 23
ADLS_ACUITY_SCORE: 24
ADLS_ACUITY_SCORE: 23
ADLS_ACUITY_SCORE: 24
ADLS_ACUITY_SCORE: 21
ADLS_ACUITY_SCORE: 24

## 2021-12-14 NOTE — PROGRESS NOTES
"[facility]    Hospitalist Progress Note             Date of Admission:  12/10/2021                   Day of hospitalization: 3    Assessment and Plan:      36-year-old gentleman not on any medications at home though may have sleep apnea.  He drinks between 12 and 16 beers a day which she has been doing for a year and the significant other states that this is due to increased stressors in his life, his last drink was on Sunday, 12/5/2021 starting her on Wednesday, 12/8/2021 he started having visual hallucinations seeing people was brought to [facility] 12/10/2021 with esteban alcohol withdrawal delirium tremens and since have been treated with valproic acid and Precedex drip.    He also had an episode of aspiration pneumonia and has been treated with IV levofloxacin and Flagyl.  He has been transferred out of the ICU 12/11/2021.  Currently he will patient is doing well requesting to be discharged. Agreeable to stay overnight to imporove potassium and monitor for further fever episodes.        Acute alcohol withdrawal  Delirium tremens  Alcohol abuse  - resolved  - does not want to see CD    Probable Aspiration Pneumonia  Acute hypoxic respiratory failure due to above  - resolved,   - currently on flagyl and levofloxacin    Alcohol liver disease  Thrombocytopenia  - monitor stable    Superficial thrombophelebitis  - conservative management  ---------     # Code status:Full  # Anticipated discharge date and Disposition:1-2 days  # DVT: Lovenox  # IVF: dextrose with K and .45                       Toma Guallpa MD  Text Page (7am - 6pm, M-F)               Subjective   Chief Complaint: withdrawal  Subjective: feels well requesting discharge, no cough, chills, nausea, vomiting, abdominal pain       Objective   /79 (BP Location: Left arm)   Pulse 103   Temp 98.2  F (36.8  C) (Oral)   Resp 20   Ht 1.956 m (6' 5\")   Wt 61.1 kg (134 lb 11.2 oz)   SpO2 95%   BMI 15.97 kg/m     "   Physical Exam  General: Pt in NAD, normal appearance  HEENT: OP clear MMM, no JVD  Lungs: Clear to Auscultation Bilateral, normal breathing  without accessory muscle usage, no wheezing, rhonchi or crackles  Cardiac: +S1, S2, RRR, no MRG, no edema  Abdominal: normal bowel sounds, NT/ND, no hepatosplenomegaly  Skin: warm, dry, normal turgor, no rash  Psyche: A& O x3, appropriate affect             Intake/Output Summary (Last 24 hours) at 12/13/2021 1853  Last data filed at 12/13/2021 1503  Gross per 24 hour   Intake 960 ml   Output 1250 ml   Net -290 ml           Labs and Imaging Results:      Recent Labs   Lab 12/13/21  0721 12/12/21  0729   WBC 8.9 10.4   HGB 12.4* 12.2*   PLT 64* 59*        Recent Labs   Lab 12/13/21  0721 12/12/21  0729    137   CO2 22 21   BUN 8 7      No results for input(s): INR, PTT in the last 168 hours.   No results for input(s): CKMB in the last 168 hours.    Invalid input(s): TROPONINT     Recent Labs   Lab 12/13/21  0721 12/12/21  0729   ALBUMIN 2.4* 2.0*   AST 44 53*   ALT 43 47   ALKPHOS 61 53        Micro:     Radio:  US Upper Extremity Venous Duplex Right   Final Result   IMPRESSION:   1.  No deep venous thrombosis in the right upper extremity.   2.  Superficial venous thrombus within the right basilic vein from the proximal to distal humerus.      XR Chest Port 1 View   Final Result   IMPRESSION: Small left pleural effusion and left basilar atelectasis/consolidation. Right lung is clear. No pneumothorax. Normal heart size.              Medications:      Scheduled Meds:      enoxaparin ANTICOAGULANT  40 mg Subcutaneous Q24H     levofloxacin  500 mg Intravenous Q24H     metroNIDAZOLE  500 mg Intravenous Q12H     multivitamin w/minerals  1 tablet Oral Daily     nicotine  1 patch Transdermal Daily     nicotine   Transdermal Q8H     pantoprazole (PROTONIX) IV  40 mg Intravenous Daily with breakfast     Continuous Infusions:      dextrose 5% and 0.45% NaCl + KCl 20 mEq/L 125 mL/hr  at 12/12/21 1200     PRN Meds:  glucose **OR** dextrose **OR** glucagon, diazepam **OR** diazepam, flumazenil, haloperidol lactate, lidocaine 4%, lidocaine (buffered or not buffered), LORazepam **OR** LORazepam, metoprolol, ondansetron **OR** ondansetron, prochlorperazine **OR** prochlorperazine **OR** prochlorperazine, sodium chloride (PF)

## 2021-12-14 NOTE — PLAN OF CARE
Pt is discharging home today.  Writer went over discharge paperwork, antibiotic prescription filled and pt signed and took with him along with personal belongings.  Pt stated he had no questions.   Pt discharged with friend who provided transportation at  1130

## 2021-12-14 NOTE — PROVIDER NOTIFICATION
Provider Notified : D5 0.45% NC and K20 mEq infusion was looks like it was given last yesterday. DO you still want the fluid to be continued? Advice. K is 3.3. getting to be replaced currently.

## 2021-12-14 NOTE — PLAN OF CARE
"/74   Pulse 84   Temp 97.9  F (36.6  C) (Oral)   Resp 20   Ht 1.956 m (6' 5\")   Wt 61.1 kg (134 lb 11.2 oz)   SpO2 96%   BMI 15.97 kg/m    Contact precautions maintained (Enteric). A&Ox4. Pleasant & cooperative. Indep in room. Steady. LS clear, infrequent dry non-prod cough. R PIV SL. R arm localized area marked where redness was yesterday. Area noted to be slightly pink at this time. Denies pain to site. No tremors or hallucinations noted or reported. Nicotine patch intact to L deltoid. Plan: possible discharge today or tomorrow.    "

## 2021-12-14 NOTE — PLAN OF CARE
DX: ETOH, Hallucination  Tele: na  A&O x4  Activity: Ind  Diet: Reg  VSS: Q8  O2: RA 96%  BG: na  PIV: SL RA  Pain: denies  GI/: continent  Labs: K 3.5, Phos 3.8  Discharge: Today home

## 2021-12-14 NOTE — PROGRESS NOTES
A&OX3. Disoriented to times. Up with SBA to independent.  CIWA score 2 and 2. Melatonin for sleep. Redness to R arm and warm. US shows no dvt. On IV flagyl. Potassium 3.3. Replaced and recheck at 0047. Patient is looking forward to discharging tomorrow. Wanted to leave AMA today, writer educated him on the need to complete his IV medication, CIWA monitoring and electrolyte replacement. Patient finally agreed to stay the night. Continue to monitor.

## 2021-12-14 NOTE — PROVIDER NOTIFICATION
Provider Notified : patient lost nicotine patch while taking shower. and is requesting for a new patch. can i get a one time order for Nicotine patch? Advice?

## 2021-12-15 LAB
ATRIAL RATE - MUSE: 98 BPM
DIASTOLIC BLOOD PRESSURE - MUSE: NORMAL MMHG
INTERPRETATION ECG - MUSE: NORMAL
P AXIS - MUSE: 59 DEGREES
PR INTERVAL - MUSE: 138 MS
QRS DURATION - MUSE: 84 MS
QT - MUSE: 362 MS
QTC - MUSE: 462 MS
R AXIS - MUSE: 85 DEGREES
SYSTOLIC BLOOD PRESSURE - MUSE: NORMAL MMHG
T AXIS - MUSE: 90 DEGREES
VENTRICULAR RATE- MUSE: 98 BPM

## 2021-12-16 LAB
BACTERIA BLD CULT: NO GROWTH
BACTERIA BLD CULT: NO GROWTH

## 2022-02-06 ENCOUNTER — HEALTH MAINTENANCE LETTER (OUTPATIENT)
Age: 37
End: 2022-02-06

## 2022-05-17 NOTE — ED NOTES
Problem: Safety - Adult  Goal: Free from fall injury  Outcome: Progressing     Problem: Skin/Tissue Integrity  Goal: Absence of new skin breakdown  Description: 1.   Monitor for areas of redness and/or skin breakdown  Outcome: Progressing Pt began becoming agitated and unable to settle down. Pt found to be largely incont of urine. Assist of 3 to clean and change. Pt trying to hit and get out of bed. GF at bedside to help.

## 2022-10-03 ENCOUNTER — HEALTH MAINTENANCE LETTER (OUTPATIENT)
Age: 37
End: 2022-10-03

## 2023-02-11 ENCOUNTER — HEALTH MAINTENANCE LETTER (OUTPATIENT)
Age: 38
End: 2023-02-11

## 2024-03-09 ENCOUNTER — HEALTH MAINTENANCE LETTER (OUTPATIENT)
Age: 39
End: 2024-03-09